# Patient Record
Sex: MALE | Race: WHITE | NOT HISPANIC OR LATINO | Employment: FULL TIME | URBAN - METROPOLITAN AREA
[De-identification: names, ages, dates, MRNs, and addresses within clinical notes are randomized per-mention and may not be internally consistent; named-entity substitution may affect disease eponyms.]

---

## 2018-01-13 NOTE — MISCELLANEOUS
Message  Patient's mother on the phone because patient is in severe pain  Today is Friday, and last night he felt lightheaded and sick  He had a fever of 102  Took some Tylenol wasn't able to sleep; but when he woke his temperature was down to 99  It stayed 99 all day  Now he is starting again with sharp pains in the stomach and his fever is 103 tonight  He took Tylenol one hour ago and feels worse  Now his temperature is 103 7  He has a sharp abdominal pain and tingling of his hands and feet with head tightness  Patient relates a history to his mother that he has been having episodes such as these monthly and has done so in November, December, January, February, and March  He is under a lot of stress and that he had a job with mentally handicapped  He stopped it 3 weeks ago because of the stress and has not been able to find a new job and has been stressed since that time  Plan: Since patient has had recurrence of this illness it is best for him to be seen while he is acutely ill  His temperature of 103 makes one worry about an abscess or some GI catastrophe  Because of this I asked him to go to [address] [place] [address] to the emergency room so they can be seen as soon as possible and at least get blood work and a CAT scan  This way they will know whether it is something that is life-threatening or something that could be done as an outpatient      Recheck as scheduled otherwise or call as needed      Signatures   Electronically signed by : Sukhjinder Jasso DO; Jul 22 2016  9:35PM EST                       (Author)

## 2018-01-16 NOTE — RESULT NOTES
Message   PLease tell him BW looks normal  Await GI evaluation     Verified Results  (1) CBC/PLT/DIFF [id number] 12:00AM Stanton Souza     Test Name Result Flag Reference   WHITE BLOOD CELL COUNT 8 3 Thousand/uL  3 8-10 8   RED BLOOD CELL COUNT 5 40 Million/uL  4 20-5 80   HEMOGLOBIN 15 6 g/dL  13 2-17 1   HEMATOCRIT 46 8 %  38 5-50 0   MCV 86 8 fL  80 0-100 0   MCH 28 8 pg  27 0-33 0   MCHC 33 2 g/dL  32 0-36 0   RDW 12 6 %  11 0-15 0   PLATELET COUNT 581 Thousand/uL  140-400   MPV 8 8 fL  7 5-11 5   ABSOLUTE NEUTROPHILS 6059 cells/uL  1967-8977   ABSOLUTE LYMPHOCYTES 1560 cells/uL  850-3900   ABSOLUTE MONOCYTES 548 cells/uL  200-950   ABSOLUTE EOSINOPHILS 116 cells/uL     ABSOLUTE BASOPHILS 17 cells/uL  0-200   NEUTROPHILS 73 0 %     LYMPHOCYTES 18 8 %     MONOCYTES 6 6 %     EOSINOPHILS 1 4 %     BASOPHILS 0 2 %       (1) COMPREHENSIVE METABOLIC PANEL [id number] 70:19PT Nirali Craven     Test Name Result Flag Reference   GLUCOSE 90 mg/dL  65-99   Fasting reference interval   UREA NITROGEN (BUN) 13 mg/dL  7-25   CREATININE 0 67 mg/dL  0 60-1 35   eGFR NON-AFR   AMERICAN 134 mL/min/1 73m2  > OR = 60   eGFR AFRICAN AMERICAN 155 mL/min/1 73m2  > OR = 60   BUN/CREATININE RATIO   9-86   NOT APPLICABLE (calc)   SODIUM 141 mmol/L  135-146   POTASSIUM 4 0 mmol/L  3 5-5 3   CHLORIDE 105 mmol/L     CARBON DIOXIDE 27 mmol/L  20-31   CALCIUM 9 9 mg/dL  8 6-10 3   PROTEIN, TOTAL 7 6 g/dL  6 1-8 1   ALBUMIN 4 8 g/dL  3 6-5 1   GLOBULIN 2 8 g/dL (calc)  1 9-3 7   ALBUMIN/GLOBULIN RATIO 1 7 (calc)  1 0-2 5   BILIRUBIN, TOTAL 0 6 mg/dL  0 2-1 2   ALKALINE PHOSPHATASE 55 U/L     AST 17 U/L  10-40   ALT 26 U/L  9-46

## 2018-07-31 ENCOUNTER — OFFICE VISIT (OUTPATIENT)
Dept: FAMILY MEDICINE CLINIC | Facility: CLINIC | Age: 28
End: 2018-07-31
Payer: COMMERCIAL

## 2018-07-31 VITALS
OXYGEN SATURATION: 97 % | HEIGHT: 66 IN | BODY MASS INDEX: 25.71 KG/M2 | TEMPERATURE: 98.6 F | HEART RATE: 89 BPM | DIASTOLIC BLOOD PRESSURE: 80 MMHG | SYSTOLIC BLOOD PRESSURE: 115 MMHG | WEIGHT: 160 LBS

## 2018-07-31 DIAGNOSIS — N23 RENAL COLIC ON RIGHT SIDE: Primary | ICD-10-CM

## 2018-07-31 PROCEDURE — 99212 OFFICE O/P EST SF 10 MIN: CPT | Performed by: FAMILY MEDICINE

## 2018-07-31 PROCEDURE — 3008F BODY MASS INDEX DOCD: CPT | Performed by: FAMILY MEDICINE

## 2018-07-31 RX ORDER — ESOMEPRAZOLE MAGNESIUM 40 MG/1
1 CAPSULE, DELAYED RELEASE ORAL DAILY
COMMUNITY
Start: 2016-09-02 | End: 2018-08-11

## 2018-07-31 NOTE — PROGRESS NOTES
PHQ-9 Depression Screening    PHQ-9:    Frequency of the following problems over the past two weeks:       Little interest or pleasure in doing things:  0 - not at all  Feeling down, depressed, or hopeless:  0 - not at all  PHQ-2 Score:  0

## 2018-07-31 NOTE — PROGRESS NOTES
Assessment/Plan:  Renal colic on right side  The patient has moderate microscopic hematuria  No leukocytes or nitrites noted  He has colicky right-sided abdominal pain which radiates to his penile region  He has a strong family history of renal colic  I discussed with the patient that based on his history examination I believe that the most likely source of his symptomatology is renal colic  We asked him to get a strainer and strain his urine  He is not in severe pain and he declines analgesics presently  He is going to push fluids and rest   He is asked to call with a report in the next 48 hours of his progress  He will seek more urgent medical attention through the emergency room as needed  We discussed potential diagnostic testing X Mahesh Robin but due to his uninsured condition presently he declines same  Diagnoses and all orders for this visit:    Renal colic on right side    Other orders  -     esomeprazole (NexIUM) 40 MG capsule; Take 1 capsule by mouth daily          Subjective:   Chief Complaint   Patient presents with    Abdominal Pain     pt states the pain started on the R side last week and now he has devoloped urinary frequency  his urine dip showed mod amount of blood, no leukocystes        Patient ID: Omkar Betancourt is a 32 y o  male  I feel like I have to pee all the time  Early last week I had pain indicated as R periumbilical area but seemed to move lower  Indicates L inguinal area  Colicky in nature  Flatus seems to relieve it  I feel a pressure inside my penis  No N/V  Sunday I felt feverish and sweats  Was not in tremendous pain  Possible FH of renal colic  HPI  Patient is a 19-year-old male who presents today with complaint of frequent urination  He also has a feeling of discomfort in his penis  He states that early last week he had some pain which she indicates to be in the right periumbilical area which was colicky in nature    It has since moved into his lower pelvis and inguinal region  He does state that seems to relieve it  He has had no nausea vomiting or anorexia  He states Sunday he felt fevers and had sweats but that has resolved  He states that the pain is not tremendous  He does have family history of renal colic with a grandfather that had frequent episodes of renal colic  He is status post appendectomy 2016  He has no personal history of renal colic  He has had no gross hematuria  He has had no penile discharge  The following portions of the patient's history were reviewed and updated as appropriate: allergies, current medications, past family history, past medical history, past social history, past surgical history and problem list     ROS    See HPI for limited review of systems  Objective:    Physical Exam   Constitutional: He appears well-developed and well-nourished  Cardiovascular: Normal rate and regular rhythm  Pulmonary/Chest: Effort normal and breath sounds normal    Abdominal: Soft  Bowel sounds are normal  He exhibits no mass  There is no tenderness  Soft nondistended nontender without mass organomegaly  No CVA tenderness  Genitourinary: Penis normal  No penile tenderness  Genitourinary Comments: Genitalia reveal normally descended testicles bilaterally without atrophy, focality or nodule  He has no inguinal adenopathy    Penis is nontender and there is no urethral/meatal tenderness or erythema

## 2018-07-31 NOTE — ASSESSMENT & PLAN NOTE
The patient has moderate microscopic hematuria  No leukocytes or nitrites noted  He has colicky right-sided abdominal pain which radiates to his penile region  He has a strong family history of renal colic  I discussed with the patient that based on his history examination I believe that the most likely source of his symptomatology is renal colic  We asked him to get a strainer and strain his urine  He is not in severe pain and he declines analgesics presently  He is going to push fluids and rest   He is asked to call with a report in the next 48 hours of his progress  He will seek more urgent medical attention through the emergency room as needed  We discussed potential diagnostic testing X Rosa Baltazar but due to his uninsured condition presently he declines same

## 2018-08-11 ENCOUNTER — OFFICE VISIT (OUTPATIENT)
Dept: FAMILY MEDICINE CLINIC | Facility: CLINIC | Age: 28
End: 2018-08-11
Payer: COMMERCIAL

## 2018-08-11 VITALS
WEIGHT: 159 LBS | TEMPERATURE: 101.8 F | DIASTOLIC BLOOD PRESSURE: 62 MMHG | SYSTOLIC BLOOD PRESSURE: 110 MMHG | BODY MASS INDEX: 25.66 KG/M2

## 2018-08-11 DIAGNOSIS — R31.9 HEMATURIA, UNSPECIFIED TYPE: Primary | ICD-10-CM

## 2018-08-11 DIAGNOSIS — R30.9 PAINFUL URINATION: ICD-10-CM

## 2018-08-11 LAB
SL AMB  POCT GLUCOSE, UA: ABNORMAL
SL AMB LEUKOCYTE ESTERASE,UA: ABNORMAL
SL AMB POCT BILIRUBIN,UA: ABNORMAL
SL AMB POCT BLOOD,UA: ABNORMAL
SL AMB POCT KETONES,UA: ABNORMAL
SL AMB POCT NITRITE,UA: ABNORMAL
SL AMB POCT PH,UA: 6
SL AMB POCT SPECIFIC GRAVITY,UA: 1.01
SL AMB POCT URINE PROTEIN: ABNORMAL
SL AMB POCT UROBILINOGEN: 0.2

## 2018-08-11 PROCEDURE — 99212 OFFICE O/P EST SF 10 MIN: CPT | Performed by: FAMILY MEDICINE

## 2018-08-11 PROCEDURE — 81002 URINALYSIS NONAUTO W/O SCOPE: CPT | Performed by: FAMILY MEDICINE

## 2018-08-11 RX ORDER — CIPROFLOXACIN 500 MG/1
500 TABLET, FILM COATED ORAL EVERY 12 HOURS SCHEDULED
Qty: 20 TABLET | Refills: 0 | Status: SHIPPED | OUTPATIENT
Start: 2018-08-11 | End: 2018-08-21

## 2018-08-11 NOTE — PROGRESS NOTES
Assessment/Plan:  Hematuria  The patient has moderate microscopic hematuria as he did at his last visit  He has some vague genitourinary symptomatology  Not classic for renal colic though he does have a strong family history of same  He does have some aching and chills today and he has a fever of 101 8  He does not have pyelonephritis clinically  We are going to treat him with Cipro 500 b i d  for 10 days  He is going to use probiotics  We discussed possible further testing such as ultrasound CT scan X [facility]  Currently he would like to try to avoid this due to the fact that he has a insurance plan which has only catastrophic coverage  He is going to be employed soon and feels he will have insurance on October 1st   We are going to see how he progresses over the next several days as far as consideration of further evaluation  We did tell him if he develops worsening fever flank pain chills altered mentation X Minerva Hogan that he would need to seek evaluation in the emergency room to which he agrees  Otherwise he is going to call on Monday with report of his condition  Diagnoses and all orders for this visit:    Hematuria, unspecified type  -     ciprofloxacin (CIPRO) 500 mg tablet; Take 1 tablet (500 mg total) by mouth every 12 (twelve) hours for 10 days    Painful urination  -     POCT urine dip          Subjective:   Chief Complaint   Patient presents with    Painful Urination     Feels Feverish        Patient ID: Naz Ryan is a 32 y o  male  Continued to have intermittent penile pain since last visit  Last night went to pee but seemed obstructed  Strained a little and seemed to urinate " a lot "  Immediately after had shaking chills and aching  Awoke this morning feeling tired, lethargic and had a fever to 101 8  Urination this am somewhat uncomfortable but no dysuria  " Pressure"  No flank pain  No nausea but anorexia  No V/D  No testicular pain     HPI   The patient is a 43-year-old male who states that he has had some intermittent symptoms since his last visit 11 days ago  At that time he had some right-sided colicky abdominal pain  He has a strong family history of renal colic and he did have some moderate microscopic hematuria  At that point we felt that his symptoms are most likely related to renal colic  He declined analgesics  We asked him to strain his urine  He states that since he was here he has had some intermittent penile discomfort though no real colicky abdominal pain and no flank pain  He states that last night when he went to urinate prior to bed he felt like his flow was obstructed he strained a little and it seemed to suddenly begin and he urinated what he describes as a lot  Immediately afterwards he had some shaking chills and some aching all over  He went to sleep and awoke this morning feeling somewhat lethargic and achy  He took his temperature and it was 101 8  He states that he continued to have somewhat uncomfortable urination 1st thing but this seems to have resolved  He describes pressure in his penile region  He has had no flank pain no nausea or vomiting but he does have some anorexia  He has no testicular pain  He has had no urethral discharge  Currently he has only catastrophic insurance and has to pay for everything out pocket  The following portions of the patient's history were reviewed and updated as appropriate: allergies, current medications, past family history, past medical history, past social history and problem list     ROS    Review of systems as per HPI  Objective:    Physical Exam   Constitutional: He is oriented to person, place, and time  He appears well-developed and well-nourished  No distress  HENT:   Mouth/Throat: Oropharynx is clear and moist  No oropharyngeal exudate  Eyes: Conjunctivae are normal    Neck: No JVD present  Cardiovascular: Normal rate, regular rhythm and normal heart sounds  Exam reveals no gallop      No murmur heard  Heart rate in the 70s   Pulmonary/Chest: Effort normal and breath sounds normal  No respiratory distress  He has no wheezes  He has no rales  Abdominal: Soft  Bowel sounds are normal  He exhibits no mass  There is no tenderness  Currently has no CVA tenderness  His abdomen is soft nondistended nontender and without mass organomegaly  Specifically suprapubic region is nontender  Genitourinary:   Genitourinary Comments: Examination of his genitalia reveal normal penis without urethral tenderness  Meatus appears normal   There is no drainage  Testicles are descended bilaterally without mass or tenderness  No hernia noted  No inguinal adenopathy  Lymphadenopathy:     He has no cervical adenopathy  Neurological: He is alert and oriented to person, place, and time  Skin: No rash noted  No erythema  Psychiatric: He has a normal mood and affect   Thought content normal

## 2018-08-11 NOTE — ASSESSMENT & PLAN NOTE
The patient has moderate microscopic hematuria as he did at his last visit  He has some vague genitourinary symptomatology  Not classic for renal colic though he does have a strong family history of same  He does have some aching and chills today and he has a fever of 101 8  He does not have pyelonephritis clinically  We are going to treat him with Cipro 500 b i d  for 10 days  He is going to use probiotics  We discussed possible further testing such as ultrasound CT scan X [facility]  Currently he would like to try to avoid this due to the fact that he has a insurance plan which has only catastrophic coverage  He is going to be employed soon and feels he will have insurance on October 1st   We are going to see how he progresses over the next several days as far as consideration of further evaluation  We did tell him if he develops worsening fever flank pain chills altered mentation X Keiry Winkler that he would need to seek evaluation in the emergency room to which he agrees  Otherwise he is going to call on Monday with report of his condition

## 2018-08-14 ENCOUNTER — HOSPITAL ENCOUNTER (EMERGENCY)
Facility: HOSPITAL | Age: 28
Discharge: HOME | End: 2018-08-14
Attending: EMERGENCY MEDICINE | Admitting: EMERGENCY MEDICINE
Payer: COMMERCIAL

## 2018-08-14 ENCOUNTER — APPOINTMENT (EMERGENCY)
Dept: CT IMAGING | Facility: HOSPITAL | Age: 28
End: 2018-08-14
Payer: COMMERCIAL

## 2018-08-14 VITALS
OXYGEN SATURATION: 98 % | RESPIRATION RATE: 20 BRPM | TEMPERATURE: 98.2 F | HEART RATE: 77 BPM | HEIGHT: 66 IN | WEIGHT: 162.92 LBS | SYSTOLIC BLOOD PRESSURE: 141 MMHG | BODY MASS INDEX: 26.18 KG/M2 | DIASTOLIC BLOOD PRESSURE: 82 MMHG

## 2018-08-14 DIAGNOSIS — N23 RENAL COLIC ON RIGHT SIDE: Primary | ICD-10-CM

## 2018-08-14 DIAGNOSIS — R11.0 NAUSEA: ICD-10-CM

## 2018-08-14 LAB
ALBUMIN SERPL BCP-MCNC: 4.1 G/DL (ref 3.5–5)
ALP SERPL-CCNC: 61 U/L (ref 46–116)
ALT SERPL W P-5'-P-CCNC: 63 U/L (ref 12–78)
ANION GAP SERPL CALCULATED.3IONS-SCNC: 9 MMOL/L (ref 4–13)
APTT PPP: 28 SECONDS (ref 24–36)
AST SERPL W P-5'-P-CCNC: 33 U/L (ref 5–45)
BACTERIA UR QL AUTO: ABNORMAL /HPF
BASOPHILS # BLD AUTO: 0.02 THOUSANDS/ΜL (ref 0–0.1)
BASOPHILS NFR BLD AUTO: 0 % (ref 0–1)
BILIRUB SERPL-MCNC: 0.6 MG/DL (ref 0.2–1)
BILIRUB UR QL STRIP: NEGATIVE
BUN SERPL-MCNC: 15 MG/DL (ref 5–25)
CALCIUM SERPL-MCNC: 9.3 MG/DL (ref 8.3–10.1)
CHLORIDE SERPL-SCNC: 101 MMOL/L (ref 100–108)
CK SERPL-CCNC: 76 U/L (ref 39–308)
CLARITY UR: CLEAR
CO2 SERPL-SCNC: 29 MMOL/L (ref 21–32)
COLOR UR: YELLOW
CREAT SERPL-MCNC: 1.24 MG/DL (ref 0.6–1.3)
EOSINOPHIL # BLD AUTO: 0.04 THOUSAND/ΜL (ref 0–0.61)
EOSINOPHIL NFR BLD AUTO: 1 % (ref 0–6)
ERYTHROCYTE [DISTWIDTH] IN BLOOD BY AUTOMATED COUNT: 11.4 % (ref 11.6–15.1)
GFR SERPL CREATININE-BSD FRML MDRD: 79 ML/MIN/1.73SQ M
GLUCOSE SERPL-MCNC: 96 MG/DL (ref 65–140)
GLUCOSE UR STRIP-MCNC: NEGATIVE MG/DL
HCT VFR BLD AUTO: 42.4 % (ref 36.5–49.3)
HGB BLD-MCNC: 15.2 G/DL (ref 12–17)
HGB UR QL STRIP.AUTO: ABNORMAL
IMM GRANULOCYTES # BLD AUTO: 0.02 THOUSAND/UL (ref 0–0.2)
IMM GRANULOCYTES NFR BLD AUTO: 0 % (ref 0–2)
INR PPP: 1 (ref 0.86–1.17)
KETONES UR STRIP-MCNC: ABNORMAL MG/DL
LACTATE SERPL-SCNC: 0.9 MMOL/L (ref 0.5–2)
LEUKOCYTE ESTERASE UR QL STRIP: NEGATIVE
LIPASE SERPL-CCNC: 157 U/L (ref 73–393)
LYMPHOCYTES # BLD AUTO: 1.19 THOUSANDS/ΜL (ref 0.6–4.47)
LYMPHOCYTES NFR BLD AUTO: 14 % (ref 14–44)
MCH RBC QN AUTO: 29.3 PG (ref 26.8–34.3)
MCHC RBC AUTO-ENTMCNC: 35.8 G/DL (ref 31.4–37.4)
MCV RBC AUTO: 82 FL (ref 82–98)
MONOCYTES # BLD AUTO: 0.54 THOUSAND/ΜL (ref 0.17–1.22)
MONOCYTES NFR BLD AUTO: 7 % (ref 4–12)
MUCOUS THREADS UR QL AUTO: ABNORMAL
NEUTROPHILS # BLD AUTO: 6.48 THOUSANDS/ΜL (ref 1.85–7.62)
NEUTS SEG NFR BLD AUTO: 78 % (ref 43–75)
NITRITE UR QL STRIP: NEGATIVE
NON-SQ EPI CELLS URNS QL MICRO: ABNORMAL /HPF
NRBC BLD AUTO-RTO: 0 /100 WBCS
PH UR STRIP.AUTO: 7 [PH] (ref 4.5–8)
PLATELET # BLD AUTO: 216 THOUSANDS/UL (ref 149–390)
PMV BLD AUTO: 9.2 FL (ref 8.9–12.7)
POTASSIUM SERPL-SCNC: 3.6 MMOL/L (ref 3.5–5.3)
PROT SERPL-MCNC: 7.9 G/DL (ref 6.4–8.2)
PROT UR STRIP-MCNC: ABNORMAL MG/DL
PROTHROMBIN TIME: 12.9 SECONDS (ref 11.8–14.2)
RBC # BLD AUTO: 5.18 MILLION/UL (ref 3.88–5.62)
RBC #/AREA URNS AUTO: ABNORMAL /HPF
SODIUM SERPL-SCNC: 139 MMOL/L (ref 136–145)
SP GR UR STRIP.AUTO: 1.02 (ref 1–1.03)
UROBILINOGEN UR QL STRIP.AUTO: 0.2 E.U./DL
WBC # BLD AUTO: 8.29 THOUSAND/UL (ref 4.31–10.16)
WBC #/AREA URNS AUTO: ABNORMAL /HPF

## 2018-08-14 PROCEDURE — 85610 PROTHROMBIN TIME: CPT | Performed by: EMERGENCY MEDICINE

## 2018-08-14 PROCEDURE — 83690 ASSAY OF LIPASE: CPT | Performed by: EMERGENCY MEDICINE

## 2018-08-14 PROCEDURE — 74177 CT ABD & PELVIS W/CONTRAST: CPT

## 2018-08-14 PROCEDURE — 87086 URINE CULTURE/COLONY COUNT: CPT | Performed by: EMERGENCY MEDICINE

## 2018-08-14 PROCEDURE — 36415 COLL VENOUS BLD VENIPUNCTURE: CPT | Performed by: EMERGENCY MEDICINE

## 2018-08-14 PROCEDURE — 85730 THROMBOPLASTIN TIME PARTIAL: CPT | Performed by: EMERGENCY MEDICINE

## 2018-08-14 PROCEDURE — 83605 ASSAY OF LACTIC ACID: CPT | Performed by: EMERGENCY MEDICINE

## 2018-08-14 PROCEDURE — 99284 EMERGENCY DEPT VISIT MOD MDM: CPT

## 2018-08-14 PROCEDURE — 80053 COMPREHEN METABOLIC PANEL: CPT | Performed by: EMERGENCY MEDICINE

## 2018-08-14 PROCEDURE — 85025 COMPLETE CBC W/AUTO DIFF WBC: CPT | Performed by: EMERGENCY MEDICINE

## 2018-08-14 PROCEDURE — 96360 HYDRATION IV INFUSION INIT: CPT

## 2018-08-14 PROCEDURE — 96361 HYDRATE IV INFUSION ADD-ON: CPT

## 2018-08-14 PROCEDURE — 82550 ASSAY OF CK (CPK): CPT | Performed by: EMERGENCY MEDICINE

## 2018-08-14 PROCEDURE — 81001 URINALYSIS AUTO W/SCOPE: CPT

## 2018-08-14 PROCEDURE — 87040 BLOOD CULTURE FOR BACTERIA: CPT | Performed by: EMERGENCY MEDICINE

## 2018-08-14 RX ORDER — METOCLOPRAMIDE 10 MG/1
10 TABLET ORAL EVERY 6 HOURS
Qty: 30 TABLET | Refills: 0 | Status: SHIPPED | OUTPATIENT
Start: 2018-08-14 | End: 2019-01-08

## 2018-08-14 RX ORDER — OXYCODONE HYDROCHLORIDE AND ACETAMINOPHEN 5; 325 MG/1; MG/1
1 TABLET ORAL EVERY 6 HOURS PRN
Qty: 20 TABLET | Refills: 0 | Status: SHIPPED | OUTPATIENT
Start: 2018-08-14 | End: 2018-08-24

## 2018-08-14 RX ADMIN — IOHEXOL 100 ML: 350 INJECTION, SOLUTION INTRAVENOUS at 08:21

## 2018-08-14 RX ADMIN — SODIUM CHLORIDE 1000 ML: 0.9 INJECTION, SOLUTION INTRAVENOUS at 07:42

## 2018-08-14 NOTE — ED ATTENDING ATTESTATION
Eliecer Le MD, saw and evaluated the patient  I have discussed the patient with the resident/non-physician practitioner and agree with the resident's/non-physician practitioner's findings, Plan of Care, and MDM as documented in the resident's/non-physician practitioner's note, except where noted  All available labs and Radiology studies were reviewed  At this point I agree with the current assessment done in the Emergency Department  I have conducted an independent evaluation of this patient a history and physical is as follows:  Patient is a 32year old male with intermittent R flank pain with occasional nausea and urinary frequency and dribbling for past 2 weeks but worse over the weekend and had fever of 101  Saw PMD who placed patient on cipro  Has had prior appy  Was last seen in this ED on 7/22/16 for appendicitis  Patient urinated in ED and felt better  No pain now  No N/V/D now  Abdomen nontender and no CVAT at this time  DDx including but not limited to: renal colic, pyelonephritis, UTI, GI etiology, diverticulitis, cholecystitis, biliary colic, rhabdomyolysis, tumor  Will check labs and urine and CT         Critical Care Time  CritCare Time    Procedures

## 2018-08-14 NOTE — DISCHARGE INSTRUCTIONS
Acute Nausea and Vomiting   WHAT YOU NEED TO KNOW:   Acute nausea and vomiting start suddenly, worsen quickly, and last a short time  DISCHARGE INSTRUCTIONS:   Return to the emergency department if:   · You see blood in your vomit or your bowel movements  · You have sudden, severe pain in your chest and upper abdomen after hard vomiting or retching  · You have swelling in your neck and chest      · You are dizzy, cold, and thirsty and your eyes and mouth are dry  · You are urinating very little or not at all  · You have muscle weakness, leg cramps, and trouble breathing  · Your heart is beating much faster than normal      · You continue to vomit for more than 48 hours  Contact your healthcare provider if:   · You have frequent dry heaves (vomiting but nothing comes out)  · Your nausea and vomiting does not get better or go away after you use medicine  · You have questions or concerns about your condition or treatment  Medicines: You may need any of the following:  · Medicines  may be given to calm your stomach and stop your vomiting  You may also need medicines to help you feel more relaxed or to stop nausea and vomiting caused by motion sickness  · Gastrointestinal stimulants  are used to help empty your stomach and bowels  This may help decrease nausea and vomiting  · Take your medicine as directed  Contact your healthcare provider if you think your medicine is not helping or if you have side effects  Tell him or her if you are allergic to any medicine  Keep a list of the medicines, vitamins, and herbs you take  Include the amounts, and when and why you take them  Bring the list or the pill bottles to follow-up visits  Carry your medicine list with you in case of an emergency  Prevent or manage acute nausea and vomiting:   · Do not drink alcohol  Alcohol may upset or irritate your stomach  Too much alcohol can also cause acute nausea and vomiting  · Control stress    Headaches due to stress may cause nausea and vomiting  Find ways to relax and manage your stress  Get more rest and sleep  · Drink more liquids as directed  Vomiting can lead to dehydration  It is important to drink more liquids to help replace lost body fluids  Ask your healthcare provider how much liquid to drink each day and which liquids are best for you  Your provider may recommend that you drink an oral rehydration solution (ORS)  ORS contains water, salts, and sugar that are needed to replace the lost body fluids  Ask what kind of ORS to use, how much to drink, and where to get it  · Eat smaller meals, more often  Eat small amounts of food every 2 to 3 hours, even if you are not hungry  Food in your stomach may decrease your nausea  · Talk to your healthcare provider before you take over-the-counter (OTC) medicines  These medicines can cause serious problems if you use certain other medicines, or you have a medical condition  You may have problems if you use too much or use them for longer than the label says  Follow directions on the label carefully  Follow up with your healthcare provider as directed:  Write down your questions so you remember to ask them during your follow-up visits  © 2017 [employer]  Information is for End User's use only and may not be sold, redistributed or otherwise used for commercial purposes  All illustrations and images included in CareNotes® are the copyrighted property of [employer] , [employer]  or [employer]over  The above information is an  only  It is not intended as medical advice for individual conditions or treatments  Talk to your doctor, nurse or pharmacist before following any medical regimen to see if it is safe and effective for you  Renal Colic   WHAT YOU NEED TO KNOW:   Renal colic is severe pain in your lower back or sides  The pain is usually on one side, but may be on both sides of your lower back   Renal colic may start quickly, come and go, and become worse over time  Renal colic is caused by a blockage in your urinary tract  The most common cause of a blockage is a kidney stone  Blood clots, ureter spasms, and dead tissue may also block your urinary tract  DISCHARGE INSTRUCTIONS:   Return to the emergency department if:   · You cannot stop vomiting  · You see new or increased bleeding when you urinate  · You are urinating less than usual, or not at all  · Your pain is not getting better even after treatment  Contact your healthcare provider if:   · You have fever  · You need to urinate more often than usual, or right away  · You see a stone in your urine strainer after you urinate  · You have questions or concerns about your condition or care  Medicines:   · Medicines  may help decrease pain and muscle spasms  You may also need medicine to calm your stomach and stop vomiting  · Take your medicine as directed  Contact your healthcare provider if you think your medicine is not helping or if you have side effects  Tell him of her if you are allergic to any medicine  Keep a list of the medicines, vitamins, and herbs you take  Include the amounts, and when and why you take them  Bring the list or the pill bottles to follow-up visits  Carry your medicine list with you in case of an emergency  Manage your symptoms:   · Drink liquids as directed  to help decrease pain and flush blockages from your urinary tract  Ask how much liquid to drink each day and which liquids are best for you  You may need to drink about 3 liters (12 glasses) of liquids each day  Half of your total daily liquids should be water  Limit coffee, tea, and soda to 2 cups daily  Your urine should be pale and clear  · Strain your urine every time you urinate  Urinate into a strainer (funnel with a fine mesh on the bottom) or glass jar to collect kidney stones  Give the kidney stones to your healthcare provider at your next visit  · Eat a variety of healthy foods  Healthy foods include fruits, vegetables, whole-grain breads, low-fat dairy products, beans, lean meats, and fish  You may need to increase the amount of citrus fruit you eat, such as oranges  Ask your healthcare provider how much salt, calcium, and protein you should eat  · Avoid activity in hot temperatures  Heat may cause you to become dehydrated and urinate less  Follow up with your healthcare provider as directed: You may need to return for tests to check if your blockage has cleared  Write down your questions so you remember to ask them during your visits  © 2017 [employer] Information is for End User's use only and may not be sold, redistributed or otherwise used for commercial purposes  All illustrations and images included in CareNotes® are the copyrighted property of [employer]  or [employer]over  The above information is an  only  It is not intended as medical advice for individual conditions or treatments  Talk to your doctor, nurse or pharmacist before following any medical regimen to see if it is safe and effective for you

## 2018-08-14 NOTE — ED PROVIDER NOTES
History  Chief Complaint   Patient presents with    Flank Pain     Patient states intermittent right sided flank pain x2 weeks, worsened this weekend  Complaining of urinary frequency, dribbling  Prescribed Ciprofloxacin on Saturday by PCP     71-year-old male presents emergency department with complaints of a combination of intermittent dysuria, urinary urgency, difficulty starting urine flow  He reports that symptoms began prior to July 31st when he was seen by his primary care provider for right-sided flank pain, at that time he was diagnosed with acid reflux in prescribed Nexium  Seen again on August 11th with right-sided flank pain and fever, urinalysis was completed which revealed no infection however he was started on Cipro and diagnosed with renal colic  Patient presents emergency department today with complaints of unbearable 10/10 right flank pain that radiates to the penile area  Reports that pain was present until he urinated during stay in the ED  A current patient has no right flank pain no CVA tenderness on either side and reports urinating without difficulty  Urine inspected and there are no evidence of stone or sedimentation and specimen cup  Patient denies seen stone and toilet  Reports that he last had fever on August 11th has been intermittently able to urinate without difficulty  ROS:  Intermittent chills, nausea and right flank pain  Has been afebrile for the past 48 hrs  Denies chest pain/tenderness, shortness of breath, vomiting, diarrhea, sick contacts, trauma, abdominal pain, changes in bowel habits, black tarry stool  History provided by:  Patient   used: No        Prior to Admission Medications   Prescriptions Last Dose Informant Patient Reported?  Taking?   ciprofloxacin (CIPRO) 500 mg tablet   No No   Sig: Take 1 tablet (500 mg total) by mouth every 12 (twelve) hours for 10 days      Facility-Administered Medications: None       Past Medical History:   Diagnosis Date    Abdominal pain, periumbilical 8/6/8219    Appendicitis 7/23/2016    Blepharitis of eyelid of left eye 6/30/2016    Eczema     Hypokalemia 9/2/2016    Leukocytosis 9/2/2016       Past Surgical History:   Procedure Laterality Date    TN LAP,APPENDECTOMY N/A 7/23/2016    Procedure: APPENDECTOMY LAPAROSCOPIC;  Surgeon: Sunitha Desouza DO;  Location[facility];  Service: General       Family History   Problem Relation Age of Onset    Nephrolithiasis Maternal Grandfather      I have reviewed and agree with the history as documented  Social History   Substance Use Topics    Smoking status: Never Smoker    Smokeless tobacco: Never Used    Alcohol use No        Review of Systems   Constitutional: Positive for chills and fever  Negative for appetite change and diaphoresis  Respiratory: Negative for chest tightness and shortness of breath  Cardiovascular: Negative for chest pain and palpitations  Gastrointestinal: Positive for nausea  Negative for abdominal distention, abdominal pain, blood in stool, diarrhea and vomiting  Genitourinary: Positive for difficulty urinating, dysuria, flank pain and urgency  Negative for genital sores, hematuria, penile swelling and testicular pain  Musculoskeletal: Negative for back pain  Skin: Negative for rash and wound  All other systems reviewed and are negative  Physical Exam  Physical Exam   Constitutional: He is oriented to person, place, and time  He appears well-developed and well-nourished  He appears distressed  Nontoxic in appearance in mild distress   HENT:   Head: Normocephalic  Mouth/Throat: Oropharynx is clear and moist    Eyes: EOM are normal  Pupils are equal, round, and reactive to light  Right eye exhibits no discharge  Left eye exhibits no discharge  No scleral icterus  Neck: Normal range of motion  Neck supple  Cardiovascular: Normal rate, regular rhythm, normal heart sounds and intact distal pulses  Exam reveals no gallop and no friction rub  No murmur heard  Pulmonary/Chest: Effort normal and breath sounds normal  No stridor  No respiratory distress  He has no wheezes  He has no rales  He exhibits no tenderness  Abdominal: Soft  Bowel sounds are normal  He exhibits no distension and no mass  There is no tenderness  There is no rebound and no guarding  No hernia  Genitourinary: Penis normal  No penile tenderness  Musculoskeletal: Normal range of motion  Neurological: He is alert and oriented to person, place, and time  Skin: Skin is warm and dry  Capillary refill takes less than 2 seconds  No rash noted  He is not diaphoretic  No erythema  No pallor  Psychiatric: He has a normal mood and affect  His behavior is normal    Nursing note and vitals reviewed  Vital Signs  ED Triage Vitals   Temperature Pulse Respirations Blood Pressure SpO2   08/14/18 0552 08/14/18 0552 08/14/18 0551 08/14/18 0552 08/14/18 0552   98 2 °F (36 8 °C) 77 20 141/82 98 %      Temp Source Heart Rate Source Patient Position - Orthostatic VS BP Location FiO2 (%)   08/14/18 0551 08/14/18 0551 08/14/18 0552 08/14/18 0552 --   Oral Monitor Sitting Left arm       Pain Score       08/14/18 0552       6           Vitals:    08/14/18 0552   BP: 141/82   Pulse: 77   Patient Position - Orthostatic VS: Sitting       Visual Acuity      ED Medications  Medications   sodium chloride 0 9 % bolus 1,000 mL (1,000 mL Intravenous New Bag 8/14/18 0742)   iohexol (OMNIPAQUE) 350 MG/ML injection (MULTI-DOSE) 100 mL (100 mL Intravenous Given 8/14/18 0821)       Diagnostic Studies  Results Reviewed     Procedure Component Value Units Date/Time    Urine culture [[id number]] Collected:  08/14/18 0845    Lab Status:   In process Specimen:  Urine from Urine, Clean Catch Updated:  08/14/18 0845    Urine Microscopic [[id number]]  (Abnormal) Collected:  08/14/18 0758    Lab Status:  Final result Specimen:  Urine Updated:  08/14/18 0841     RBC, UA 4-10 (A) /hpf      WBC, UA 0-1 (A) /hpf      Epithelial Cells Occasional /hpf      Bacteria, UA Occasional /hpf      MUCOUS THREADS Moderate (A)    Lactic acid, plasma [[id number]]  (Normal) Collected:  08/14/18 0740    Lab Status:  Final result Specimen:  Blood from Arm, Right Updated:  08/14/18 5868     LACTIC ACID 0 9 mmol/L     Narrative:         Result may be elevated if tourniquet was used during collection  Lipase [[id number]]  (Normal) Collected:  08/14/18 0740    Lab Status:  Final result Specimen:  Blood from Arm, Right Updated:  08/14/18 0809     Lipase 157 u/L     CK Total with Reflex CKMB [[id number]]  (Normal) Collected:  08/14/18 0740    Lab Status:  Final result Specimen:  Blood from Arm, Right Updated:  08/14/18 0809     Total CK 76 U/L     Comprehensive metabolic panel [[id number]] Collected:  08/14/18 0740    Lab Status:  Final result Specimen:  Blood from Arm, Right Updated:  08/14/18 2464     Sodium 139 mmol/L      Potassium 3 6 mmol/L      Chloride 101 mmol/L      CO2 29 mmol/L      Anion Gap 9 mmol/L      BUN 15 mg/dL      Creatinine 1 24 mg/dL      Glucose 96 mg/dL      Calcium 9 3 mg/dL      AST 33 U/L      ALT 63 U/L      Alkaline Phosphatase 61 U/L      Total Protein 7 9 g/dL      Albumin 4 1 g/dL      Total Bilirubin 0 60 mg/dL      eGFR 79 ml/min/1 73sq m     Narrative:         National Kidney Disease Education Program recommendations are as follows:  GFR calculation is accurate only with a steady state creatinine  Chronic Kidney disease less than 60 ml/min/1 73 sq  meters  Kidney failure less than 15 ml/min/1 73 sq  meters      Avery Reynolds [[id number]]  (Normal) Collected:  08/14/18 0740    Lab Status:  Final result Specimen:  Blood from Arm, Right Updated:  08/14/18 0805     Protime 12 9 seconds      INR 1 00    APTT [[id number]]  (Normal) Collected:  08/14/18 0740    Lab Status:  Final result Specimen:  Blood from Arm, Right Updated:  08/14/18 0805     PTT 28 seconds     Blood culture #2 [[id number]] Collected:  08/14/18 0740    Lab Status: In process Specimen:  Blood from Arm, Right Updated:  08/14/18 0754    CBC and differential [[id number]]  (Abnormal) Collected:  08/14/18 0740    Lab Status:  Final result Specimen:  Blood from Arm, Right Updated:  08/14/18 0752     WBC 8 29 Thousand/uL      RBC 5 18 Million/uL      Hemoglobin 15 2 g/dL      Hematocrit 42 4 %      MCV 82 fL      MCH 29 3 pg      MCHC 35 8 g/dL      RDW 11 4 (L) %      MPV 9 2 fL      Platelets 693 Thousands/uL      nRBC 0 /100 WBCs      Neutrophils Relative 78 (H) %      Immat GRANS % 0 %      Lymphocytes Relative 14 %      Monocytes Relative 7 %      Eosinophils Relative 1 %      Basophils Relative 0 %      Neutrophils Absolute 6 48 Thousands/µL      Immature Grans Absolute 0 02 Thousand/uL      Lymphocytes Absolute 1 19 Thousands/µL      Monocytes Absolute 0 54 Thousand/µL      Eosinophils Absolute 0 04 Thousand/µL      Basophils Absolute 0 02 Thousands/µL     ED Urine Macroscopic [[id number]]  (Abnormal) Collected:  08/14/18 0758    Lab Status:  Final result Specimen:  Urine Updated:  08/14/18 0749     Color, UA Yellow     Clarity, UA Clear     pH, UA 7 0     Leukocytes, UA Negative     Nitrite, UA Negative     Protein, UA 30 (1+) (A) mg/dl      Glucose, UA Negative mg/dl      Ketones, UA 15 (1+) (A) mg/dl      Urobilinogen, UA 0 2 E U /dl      Bilirubin, UA Negative     Blood, UA Moderate (A)     Specific [place], UA 1 025    Narrative:       CLINITEK RESULT    Blood culture #1 [[id number]]     Lab Status:  No result Specimen:  Blood                  CT abdomen pelvis with contrast   Final Result by Vicky Mar MD (08/14 0901)      3 mm right UVJ calculus evoking mild ureterectasis without significant hydronephrosis  2 mm nonobstructing left lower pole calculus  Appendectomy              Workstation performed: [id number]                    Procedures  Procedures       Phone Contacts  ED Phone Contact    ED Course MDM  Number of Diagnoses or Management Options  Nausea: new and requires workup  Renal colic on right side: new and requires workup  Diagnosis management comments: 59-year-old male presents emergency department with complaints of a combination of intermittent dysuria, urinary urgency, difficulty starting urine flow  DDx including but not limited to: renal colic, pyelonephritis, UTI, GI etiology, appendicitis, diverticulitis, cholecystitis, biliary colic, AAA, rhabdomyolysis, tumor  Will complete baseline labs, urinalysis, CT of the abdomen and pelvis  0800:Lab work does reveal increasing creatinine to 1 24 for baseline of 0 67  Urinalysis unremarkable  Still pending CT of abdomen and pelvis  Results discussed with patient and father, verbalized understanding  Will continue IV hydration of normal saline at 125 cc/hour  0900:  CT abdomen and pelvis the results reveal: 3 mm right UVJ calculus evoking mild ureterectasis without significant hydronephrosis  2 mm nonobstructing left lower pole calculus  Findings explained to patient and family at bedside, verbalized understanding  Patient will be discharged and are in agreement with plan of care  Will discharge home with Reglan and Percocet  Instructed to follow up with Urology within 2 days a visit  Given strict instructions on when to return to the emergency department  The patient (and any family present) verbalized understanding of the discharge instructions and warnings that would necessitate return to the Emergency Department  Gave verbal in addition to written discharge instructions  Specifically highlighted areas of special concern regarding the written and verbal discharge instructions and return precautions  All questions were answered prior to discharge           Amount and/or Complexity of Data Reviewed  Clinical lab tests: ordered and reviewed  Tests in the radiology section of CPT®: ordered and reviewed  Discuss the patient with other providers: yes (Dr Eric Melendez)    Risk of Complications, Morbidity, and/or Mortality  Presenting problems: moderate  Diagnostic procedures: moderate  Management options: moderate    Patient Progress  Patient progress: stable    CritCare Time    Disposition  Final diagnoses:   Nausea   Renal colic on right side     Time reflects when diagnosis was documented in both MDM as applicable and the Disposition within this note     Time User Action Codes Description Comment    8/14/2018  9:05 AM Zachery Laming Add [R11 0] Nausea     8/14/2018  9:05 AM Zachery Laming Add [Q13] Renal colic     8/90/3123  8:64 AM Zachery Laming Remove [W10] Renal colic     3/58/5789  3:21 AM Zachery Laming Add [G03] Renal colic on right side     8/14/2018  9:05 AM [place], [facility] [R11 0] Nausea     8/14/2018  9:05 AM Zachery Laming Modify [A70] Renal colic on right side       ED Disposition     ED Disposition Condition Comment    Discharge  Dana Boone 1943 discharge to home/self care  Condition at discharge: Stable        Follow-up Information     Follow up With Specialties Details Why Contact Info Additional [facility] [place] [facility] Schedule an appointment as soon as possible for a visit in 2 days For follow-up and further management of renal colic    [address] [facility] [address]  [place] [address] Emergency Department Emergency Medicine  If symptoms worsen or become worrisome [address] [place] [address] [phone] [facility], [address], [address]    Easton Maya MD Family Medicine  As needed [address] [address] [phone]             Patient's Medications   Discharge Prescriptions    METOCLOPRAMIDE (REGLAN) 10 MG TABLET    Take 1 tablet (10 mg total) by mouth every 6 (six) hours       Start Date: 8/14/2018 End Date: --       Order Dose: 10 mg       Quantity: 30 tablet    Refills: 0    OXYCODONE-ACETAMINOPHEN (PERCOCET) 5-325 MG PER TABLET    Take 1 tablet by mouth every 6 (six) hours as needed for moderate pain for up to 10 days Max Daily Amount: 4 tablets       Start Date: 8/14/2018 End Date: 8/24/2018       Order Dose: 1 tablet       Quantity: 20 tablet    Refills: 0     No discharge procedures on file      ED Provider  Electronically Signed by           [employer], Shantal Hall  08/14/18 7293

## 2018-08-15 LAB — BACTERIA UR CULT: NORMAL

## 2018-08-19 LAB — BACTERIA BLD CULT: NORMAL

## 2018-11-12 ENCOUNTER — TELEPHONE (OUTPATIENT)
Dept: UROLOGY | Facility: MEDICAL CENTER | Age: 28
End: 2018-11-12

## 2018-11-12 NOTE — TELEPHONE ENCOUNTER
Please triage:    Reason for appointment/Complaint/Diagnosis :  Kidney stone - right side flank pain - was seen at [facility] in [place] on 11/9/18  Insurance: Dian Herrera    History of Cancer? no                       If yes, what kind? Previous urologist?     No                  Records requested/where? In [facility]    Outside testing/where? N/A    Location Preference for office visit? [place], any time or day

## 2018-11-12 NOTE — TELEPHONE ENCOUNTER
Info from [facility] sent over to be scanned in patient's chart  Left VM for patient advising we will contact him tomorrow with appointment information

## 2018-11-13 NOTE — TELEPHONE ENCOUNTER
UA from [facility] revealing microscopic blood, leukocyte and nitrite negative  Would recommend new patient appointment within the next 1-2 weeks  Please provide ER precautions in the meantime  Thank you

## 2018-11-13 NOTE — TELEPHONE ENCOUNTER
Called and spoke to patient  He was initially seen in [facility] on 8/14/18 for kidney stone,  CT done 8/14/18 ,  3 mm right UVJ calculus and 2 mm non obstructing left lower pole calculus  He states that he has not noticed passing any stones over the past few weeks  He has been experiencing symptoms off and on for the past few weeks,  He initially did not follow up because he did not have health insurance, he now has coverage  Patient states that on Friday 11/9/18 he had increased right sided flank pain, vomited multiple times, and had cold sweats  He also experienced increased pressure, tight feeling in penis and urinary urgency  He was seen at [facility],  In the media section labs are scan UA with Micro done  He has had no further imaging  He was not prescribed any medications   Patient states his symptoms slightly worsened on Saturday 11/10/18, he has not noticed passing any stone  This morning his pain level is 1  He states he has more of bilateral  muscle soreness right and left lower back, he is urinating without difficulty, no fever, no chills, no nausea or vomiting  He is at work today  Advised patient that message will be sent to provider to review and make recommendation and clinical will call him back  Patient will be in and out of office meetings and would like detailed message left on voicemail and he will return call  Please route to [facility] clinical pool  Thank you

## 2018-11-13 NOTE — TELEPHONE ENCOUNTER
Attempted to reach patient, left detailed message it is recommended he be scheduled for new patient appointment within the next 2 weeks,  Advised we had opening tomorrow and that I have tentatively scheduled him for NP appt on 11/14/18 at 10:45 AM with Dr Robin Khalil, request he call office back to confirm and to get scheduling /office location details

## 2018-11-14 ENCOUNTER — OFFICE VISIT (OUTPATIENT)
Dept: UROLOGY | Facility: CLINIC | Age: 28
End: 2018-11-14
Payer: COMMERCIAL

## 2018-11-14 VITALS
SYSTOLIC BLOOD PRESSURE: 110 MMHG | WEIGHT: 152.4 LBS | DIASTOLIC BLOOD PRESSURE: 80 MMHG | HEART RATE: 99 BPM | BODY MASS INDEX: 23.92 KG/M2 | HEIGHT: 67 IN

## 2018-11-14 DIAGNOSIS — N20.0 KIDNEY STONES: Primary | ICD-10-CM

## 2018-11-14 LAB
SL AMB  POCT GLUCOSE, UA: ABNORMAL
SL AMB LEUKOCYTE ESTERASE,UA: ABNORMAL
SL AMB POCT BILIRUBIN,UA: ABNORMAL
SL AMB POCT BLOOD,UA: ABNORMAL
SL AMB POCT CLARITY,UA: CLEAR
SL AMB POCT COLOR,UA: YELLOW
SL AMB POCT KETONES,UA: ABNORMAL
SL AMB POCT NITRITE,UA: ABNORMAL
SL AMB POCT PH,UA: 7.5
SL AMB POCT SPECIFIC GRAVITY,UA: 1.01
SL AMB POCT URINE PROTEIN: ABNORMAL
SL AMB POCT UROBILINOGEN: ABNORMAL

## 2018-11-14 PROCEDURE — 99244 OFF/OP CNSLTJ NEW/EST MOD 40: CPT | Performed by: UROLOGY

## 2018-11-14 PROCEDURE — 81002 URINALYSIS NONAUTO W/O SCOPE: CPT | Performed by: UROLOGY

## 2018-11-14 NOTE — PROGRESS NOTES
Referring Physician: Shavon Pyle MD  A copy of this note was sent to the referring physician  Diagnoses and all orders for this visit:    Kidney stones  -     POCT urine dip  -     XR abdomen 1 view kub; Future  -     US kidney and bladder; Future  -     Ambulatory referral to Nephrology; Future  -     XR abdomen 1 view kub; Future            Assessment and plan:       1  Recurrent nephrolithiasis    Annika De La Cruz underwent 2 CT scans in the past 6 months  Most recent 3 months ago revealed a 3 mm UVJ calculus as well as a 2 mm left intrarenal calculus  He presents today with 48 hr of flank pain which has now resolved he was also found to have microscopic hematuria  He is currently asymptomatic    I recommended restaging his stone burden with a KUB renal ultrasound  He will be contacted if there any acute findings require further evaluation  This is unlikely based on his minimal stone burden on prior CT In addition I placed a referral to Nephrology for metabolic evaluation and we will see him back in 6 months with an updated KUB at that time      Dakotah Jacobo MD      Chief Complaint     Flank pain      History of Present Illness     Mary Anne Song is a 32 y o  male referred for evaluation of nephrolithiasis and flank pain  Patient has been evaluated in the emergency department twice over the past 6 months for stone disease  Most recently in August he was found to have a 3 mm right UVJ calculus which he subsequently passed  He was also found to have a 2 mm left intrarenal calculus in the lower pole  48 hr ago he presented to urgent care with right flank pain, left lower back pain  He was found to have microscopic hematuria  He was referred for evaluation today  At the present time he is minimally symptomatic    He describes dull vague back pain and some dysuria    Detailed Urologic History     - please refer to HPI    Review of Systems     Review of Systems   Constitutional: Negative for activity change and fatigue  HENT: Negative for congestion  Eyes: Negative for visual disturbance  Respiratory: Negative for shortness of breath and wheezing  Cardiovascular: Negative for chest pain and leg swelling  Gastrointestinal: Negative for abdominal pain  Endocrine: Positive for polyuria  Genitourinary: Positive for dysuria, flank pain and hematuria  Negative for urgency  Musculoskeletal: Negative for back pain  Allergic/Immunologic: Negative for immunocompromised state  Neurological: Negative for dizziness and numbness  Psychiatric/Behavioral: Negative for dysphoric mood  All other systems reviewed and are negative  Allergies     Allergies   Allergen Reactions    Sulfa Antibiotics Rash       Physical Exam     Physical Exam   Constitutional: He is oriented to person, place, and time  He appears well-developed and well-nourished  No distress  HENT:   Head: Normocephalic and atraumatic  Eyes: EOM are normal    Neck: Normal range of motion  Cardiovascular:   Negative lower extremity edema   Pulmonary/Chest: Effort normal and breath sounds normal    Abdominal: Soft  Genitourinary:   Genitourinary Comments: Negative CVA tenderness   Musculoskeletal: Normal range of motion  Neurological: He is alert and oriented to person, place, and time  Skin: Skin is warm  Psychiatric: He has a normal mood and affect   His behavior is normal            Vital Signs  Vitals:    11/14/18 1046   BP: 110/80   BP Location: Left arm   Patient Position: Sitting   Cuff Size: Adult   Pulse: 99   Weight: 69 1 kg (152 lb 6 4 oz)   Height: 5' 7" (1 702 m)         Current Medications       Current Outpatient Prescriptions:     metoclopramide (REGLAN) 10 mg tablet, Take 1 tablet (10 mg total) by mouth every 6 (six) hours, Disp: 30 tablet, Rfl: 0      Active Problems     Patient Active Problem List   Diagnosis    Lateral epicondylitis    Peptic disease    Renal colic on right side    Hematuria    Kidney stones         Past Medical History     Past Medical History:   Diagnosis Date    Abdominal pain, periumbilical 9/7/0296    Appendicitis 7/23/2016    Blepharitis of eyelid of left eye 6/30/2016    Eczema     Hypokalemia 9/2/2016    Leukocytosis 9/2/2016         Surgical History     Past Surgical History:   Procedure Laterality Date    GA LAP,APPENDECTOMY N/A 7/23/2016    Procedure: APPENDECTOMY LAPAROSCOPIC;  Surgeon: Alfonza Nyhan, DO;  Location[facility];  Service: General         Family History     Family History   Problem Relation Age of Onset    Nephrolithiasis Maternal Grandfather          Social History     Social History     History   Smoking Status    Never Smoker   Smokeless Tobacco    Never Used         Pertinent Lab Values     Lab Results   Component Value Date    CREATININE 1 24 08/14/2018       No results found for: PSA    @RESULTRCNT(1H])@      Pertinent Imaging     Personally viewed prior CTs as summarized in HPI    Portions of the record may have been created with voice recognition software   Occasional wrong word or "sound a like" substitutions may have occurred due to the inherent limitations of voice recognition software   Read the chart carefully and recognize, using context, where substitutions have occurred

## 2018-11-16 ENCOUNTER — HOSPITAL ENCOUNTER (OUTPATIENT)
Dept: RADIOLOGY | Facility: HOSPITAL | Age: 28
Discharge: HOME | End: 2018-11-16
Attending: UROLOGY
Payer: COMMERCIAL

## 2018-11-16 ENCOUNTER — HOSPITAL ENCOUNTER (OUTPATIENT)
Dept: ULTRASOUND IMAGING | Facility: HOSPITAL | Age: 28
Discharge: HOME | End: 2018-11-16
Attending: UROLOGY
Payer: COMMERCIAL

## 2018-11-16 DIAGNOSIS — N20.0 KIDNEY STONES: ICD-10-CM

## 2018-11-16 PROCEDURE — 76770 US EXAM ABDO BACK WALL COMP: CPT

## 2018-11-16 PROCEDURE — 74018 RADEX ABDOMEN 1 VIEW: CPT

## 2018-11-21 ENCOUNTER — TELEPHONE (OUTPATIENT)
Dept: UROLOGY | Facility: CLINIC | Age: 28
End: 2018-11-21

## 2018-11-21 DIAGNOSIS — N20.0 NEPHROLITHIASIS: Primary | ICD-10-CM

## 2018-11-21 NOTE — TELEPHONE ENCOUNTER
----- Message from Tiffanie Coleman MD sent at 11/21/2018  1:26 PM EST -----  Edit              Please let the patient know unfortunately it was not possible to determine if his stone has passed or not based on his x-ray and ultrasound      I have ordered a stone protocol CT scan    Let us make sure this is scheduled prior to his next follow-up visit

## 2018-11-21 NOTE — PROGRESS NOTES
Please let the patient know unfortunately it was not possible to determine if his stone has passed or not based on his x-ray and ultrasound  I have ordered a stone protocol CT scan    Let us make sure this is scheduled prior to his next follow-up visit

## 2018-11-21 NOTE — TELEPHONE ENCOUNTER
Called and left message for patient stating that per Ed Alegria he is unable to tell if stone has passed and that he needs to have a CT done  Left central scheduling's phone number in message  Patient is to call the office with any questions or concerns  Office number was left in the message

## 2018-11-26 ENCOUNTER — TELEPHONE (OUTPATIENT)
Dept: NEPHROLOGY | Facility: CLINIC | Age: 28
End: 2018-11-26

## 2018-11-26 NOTE — TELEPHONE ENCOUNTER
Called patient to inform him that we needed to reschedule his appointment as Dr Debi Holter will not be in the office tomorrow  Spoke with the patient's mother and she will have him call us to reschedule

## 2018-12-04 ENCOUNTER — OFFICE VISIT (OUTPATIENT)
Dept: NEPHROLOGY | Facility: CLINIC | Age: 28
End: 2018-12-04
Payer: COMMERCIAL

## 2018-12-04 VITALS
HEART RATE: 78 BPM | HEIGHT: 67 IN | BODY MASS INDEX: 23.7 KG/M2 | WEIGHT: 151 LBS | DIASTOLIC BLOOD PRESSURE: 86 MMHG | SYSTOLIC BLOOD PRESSURE: 122 MMHG

## 2018-12-04 DIAGNOSIS — N20.0 KIDNEY STONES: Primary | ICD-10-CM

## 2018-12-04 DIAGNOSIS — R31.21 ASYMPTOMATIC MICROSCOPIC HEMATURIA: ICD-10-CM

## 2018-12-04 LAB
SL AMB  POCT GLUCOSE, UA: NORMAL
SL AMB LEUKOCYTE ESTERASE,UA: NORMAL
SL AMB POCT BILIRUBIN,UA: NORMAL
SL AMB POCT BLOOD,UA: NORMAL
SL AMB POCT CLARITY,UA: CLEAR
SL AMB POCT COLOR,UA: YELLOW
SL AMB POCT KETONES,UA: NORMAL
SL AMB POCT NITRITE,UA: NORMAL
SL AMB POCT PH,UA: 6
SL AMB POCT SPECIFIC GRAVITY,UA: 1.01
SL AMB POCT URINE PROTEIN: NORMAL
SL AMB POCT UROBILINOGEN: 0.2

## 2018-12-04 PROCEDURE — 81002 URINALYSIS NONAUTO W/O SCOPE: CPT | Performed by: INTERNAL MEDICINE

## 2018-12-04 PROCEDURE — 99244 OFF/OP CNSLTJ NEW/EST MOD 40: CPT | Performed by: INTERNAL MEDICINE

## 2018-12-04 NOTE — PROGRESS NOTES
NEPHROLOGY OUTPATIENT CONSULTATION   Cheyenne Barajas 29 y o  male MRN: [id number]  Date: 12/4/2018  Reason for consultation:   Chief Complaint   Patient presents with    Consult    Nephrolithiasis       ASSESSMENT AND PLAN:  Recurrent nephrolithiasis  -patient was found to have renal stone on CT scan in 2016 1st time when it showed bilateral intrarenal calculi 1 to 2 mm in size  No ureteral calculi were found  Since then he has been having intermittent flank/back pain issues   -most recent CT scan in August 2018 shows 3 mm calculus at the right UVJ without significant hydronephrosis  2 mm left lower pole calculus was also noted   -patient is scheduled for repeat CT scan later this week  -workup in August 2018 shows serum creatinine 1 2, calcium 9 3, bicarb level 29  -will check 24 hour urine stone risk profile, urinalysis, urine microalbumin/creatinine ratio, PTH, phosphorus, BMP before next visit  -patient admits not to be drinking enough free water  I have strongly recommended him to increase fluid intake with goal urine output greater than 2 L per day  -salt restricted diet  Patient admits to be eating high salt diet  -low oxalate diet, education material provided  -no stone analysis available  -closely follows with Urology  -will make further recommendations based on 24 hour urine stone risk profile results  -urinalysis bland without hematuria or proteinuria in the office today  Urine pH 6    microscopic hematuria  -urinalysis in the office today shows no hematuria or proteinuria  Patient denies any gross hematuria episodes  -previous urinalysis in August 2018 showed microscopic hematuria which could be secondary to renal stones  -continue to monitor  Will do repeat urinalysis before next visit  Only recent creatinine value available 1 2 in August 2018  Prior creatinine 0 6 in 2016   -will do repeat BMP before next visit  Patient denies any obvious history of hypertension or diabetes  Denies any history of autoimmune disease  He says that he may have ? IBS although denies any obvious history of ulcerative colitis/Crohn's disease  He has been having intermittent stomach upset issues and occasional loose stool may concern for diarrhea predominant IBS  He has followed with GI in the past   Never had endoscopy done  HISTORY OF PRESENT ILLNESS:  Deana Marrufo is a 29y o  year old male without any significant medical history who presents for initial consultation for nephrolithiasis  Old medical records were reviewed  Patient was initially found to have bilateral intrarenal stones in 2016 based on CT scan although he was asymptomatic from renal stone perspective  Lately over last one year he started noticing intermittent flank/back pain  Repeat CT scan in August 2018 showed right UVJ calculus and left lower pole calculus  He also follows with Urology  He admits not to be drinking enough free water and also has high salt intake in his diet  He has never done 24 hour urine collection for stone risk profile before  Denies any history of ulcerative colitis or Crohn's disease  Denies any autoimmune conditions  Does not take any medications on a regular basis  Denies any recent NSAID use  His creatinine was 1 2 in August 2018 during the episode of kidney stones and flank pain  Denies any gross hematuria or any other urinary complaint  He does have stomach upset issues off and on  Family history includes grandfather having multiple kidney stones  REVIEW OF SYSTEMS:    More than 10 point review of systems were obtained and discussed in length with the patient  Complete review of systems were negative / unremarkable except mentioned in the HPI section        PHYSICAL EXAM:  Vitals:    12/04/18 1552 12/04/18 1633   BP: 110/66 122/86   BP Location: Right arm    Patient Position: Sitting    Cuff Size: Adult    Pulse: 78    Weight: 68 5 kg (151 lb)    Height: 5' 7" (1 702 m) Body mass index is 23 65 kg/m²  Physical Exam   Constitutional: He is oriented to person, place, and time  He appears well-developed and well-nourished  HENT:   Head: Normocephalic and atraumatic  Right Ear: External ear normal    Left Ear: External ear normal    Nose: Nose normal    Eyes: Pupils are equal, round, and reactive to light  Conjunctivae and EOM are normal  No scleral icterus  Neck: Neck supple  No JVD present  Cardiovascular: Normal rate and normal heart sounds  Pulmonary/Chest: Effort normal and breath sounds normal  No respiratory distress  He has no wheezes  He has no rales  Abdominal: Soft  Bowel sounds are normal  He exhibits no distension  There is no tenderness  Musculoskeletal: He exhibits no edema or tenderness  Neurological: He is alert and oriented to person, place, and time  Skin: Skin is warm and dry  Psychiatric: He has a normal mood and affect  His behavior is normal    Vitals reviewed        PAST MEDICAL HISTORY:  Past Medical History:   Diagnosis Date    Abdominal pain, periumbilical 6/7/5243    Appendicitis 7/23/2016    Blepharitis of eyelid of left eye 6/30/2016    Eczema     Hypokalemia 9/2/2016    Leukocytosis 9/2/2016       PAST SURGICAL HISTORY:  Past Surgical History:   Procedure Laterality Date    AR LAP,APPENDECTOMY N/A 7/23/2016    Procedure: APPENDECTOMY LAPAROSCOPIC;  Surgeon: Karin Colon DO;  Location: [facility];  Service: General       ALLERGIES:  Allergies   Allergen Reactions    Sulfa Antibiotics Rash       SOCIAL HISTORY:  History   Alcohol Use No     History   Drug Use No     History   Smoking Status    Never Smoker   Smokeless Tobacco    Never Used       FAMILY HISTORY:  Family History   Problem Relation Age of Onset    Nephrolithiasis Maternal Grandfather        MEDICATIONS:    Current Outpatient Prescriptions:     metoclopramide (REGLAN) 10 mg tablet, Take 1 tablet (10 mg total) by mouth every 6 (six) hours (Patient not taking: Reported on 12/4/2018 ), Disp: 30 tablet, Rfl: 0    Lab Results:   Results for orders placed or performed in visit on 12/04/18   POCT urine dip   Result Value Ref Range    LEUKOCYTE ESTERASE,UA NEG     NITRITE,UA NEG     SL AMB POCT UROBILINOGEN 0 2     POCT URINE PROTEIN NEG      PH,UA 6 0     BLOOD,UA NEG     SPECIFIC GRAVITY,UA 1 010     KETONES,UA NEG     BILIRUBIN,UA NEG     GLUCOSE, UA NEG      COLOR,UA YELLOW     CLARITY,UA CLEAR     Serum creatinine 1 2 in August 2018  Portions of the record may have been created with voice recognition software  Occasional wrong word or "sound a like" substitutions may have occurred due to the inherent limitations of voice recognition software  Read the chart carefully and recognize, using context, where substitutions have occurred

## 2018-12-04 NOTE — LETTER
December 4, 2018     Hussein Stephens MD  [address] [facility]    Patient: Ada Loredo   YOB: 1990   Date of Visit: 12/4/2018       Dear Dr Lee Hiss: Thank you for referring Margeret Romberg to me for evaluation  Below are my notes for this consultation  If you have questions, please do not hesitate to call me  I look forward to following your patient along with you  Sincerely,        Irene Morgan MD        CC: No Recipients  Irene Morgan MD  12/4/2018  4:56 PM  Sign at close encounter  [address] [facility],[address] 29 y o  male MRN: [id number]  Date: 12/4/2018  Reason for consultation:   Chief Complaint   Patient presents with    Consult    Nephrolithiasis       ASSESSMENT AND PLAN:  Recurrent nephrolithiasis  -patient was found to have renal stone on CT scan in 2016 1st time when it showed bilateral intrarenal calculi 1 to 2 mm in size  No ureteral calculi were found  Since then he has been having intermittent flank/back pain issues   -most recent CT scan in August 2018 shows 3 mm calculus at the right UVJ without significant hydronephrosis  2 mm left lower pole calculus was also noted   -patient is scheduled for repeat CT scan later this week  -workup in August 2018 shows serum creatinine 1 2, calcium 9 3, bicarb level 29  -will check 24 hour urine stone risk profile, urinalysis, urine microalbumin/creatinine ratio, PTH, phosphorus, BMP before next visit  -patient admits not to be drinking enough free water  I have strongly recommended him to increase fluid intake with goal urine output greater than 2 L per day  -salt restricted diet  Patient admits to be eating high salt diet  -low oxalate diet, education material provided  -no stone analysis available  -closely follows with Urology  -will make further recommendations based on 24 hour urine stone risk profile results    -urinalysis bland without hematuria or proteinuria in the office today  Urine pH 6    microscopic hematuria  -urinalysis in the office today shows no hematuria or proteinuria  Patient denies any gross hematuria episodes  -previous urinalysis in August 2018 showed microscopic hematuria which could be secondary to renal stones  -continue to monitor  Will do repeat urinalysis before next visit  Only recent creatinine value available 1 2 in August 2018  Prior creatinine 0 6 in 2016   -will do repeat BMP before next visit  Patient denies any obvious history of hypertension or diabetes  Denies any history of autoimmune disease  He says that he may have ? IBS although denies any obvious history of ulcerative colitis/Crohn's disease  He has been having intermittent stomach upset issues and occasional loose stool may concern for diarrhea predominant IBS  He has followed with GI in the past   Never had endoscopy done  HISTORY OF PRESENT ILLNESS:  Ludivina Rocha is a 29y o  year old male without any significant medical history who presents for initial consultation for nephrolithiasis  Old medical records were reviewed  Patient was initially found to have bilateral intrarenal stones in 2016 based on CT scan although he was asymptomatic from renal stone perspective  Lately over last one year he started noticing intermittent flank/back pain  Repeat CT scan in August 2018 showed right UVJ calculus and left lower pole calculus  He also follows with Urology  He admits not to be drinking enough free water and also has high salt intake in his diet  He has never done 24 hour urine collection for stone risk profile before  Denies any history of ulcerative colitis or Crohn's disease  Denies any autoimmune conditions  Does not take any medications on a regular basis  Denies any recent NSAID use  His creatinine was 1 2 in August 2018 during the episode of kidney stones and flank pain    Denies any gross hematuria or any other urinary complaint  He does have stomach upset issues off and on  Family history includes grandfather having multiple kidney stones  REVIEW OF SYSTEMS:    More than 10 point review of systems were obtained and discussed in length with the patient  Complete review of systems were negative / unremarkable except mentioned in the HPI section  PHYSICAL EXAM:  Vitals:    12/04/18 1552 12/04/18 1633   BP: 110/66 122/86   BP Location: Right arm    Patient Position: Sitting    Cuff Size: Adult    Pulse: 78    Weight: 68 5 kg (151 lb)    Height: 5' 7" (1 702 m)      Body mass index is 23 65 kg/m²  Physical Exam   Constitutional: He is oriented to person, place, and time  He appears well-developed and well-nourished  HENT:   Head: Normocephalic and atraumatic  Right Ear: External ear normal    Left Ear: External ear normal    Nose: Nose normal    Eyes: Pupils are equal, round, and reactive to light  Conjunctivae and EOM are normal  No scleral icterus  Neck: Neck supple  No JVD present  Cardiovascular: Normal rate and normal heart sounds  Pulmonary/Chest: Effort normal and breath sounds normal  No respiratory distress  He has no wheezes  He has no rales  Abdominal: Soft  Bowel sounds are normal  He exhibits no distension  There is no tenderness  Musculoskeletal: He exhibits no edema or tenderness  Neurological: He is alert and oriented to person, place, and time  Skin: Skin is warm and dry  Psychiatric: He has a normal mood and affect  His behavior is normal    Vitals reviewed        PAST MEDICAL HISTORY:  Past Medical History:   Diagnosis Date    Abdominal pain, periumbilical 9/4/4314    Appendicitis 7/23/2016    Blepharitis of eyelid of left eye 6/30/2016    Eczema     Hypokalemia 9/2/2016    Leukocytosis 9/2/2016       PAST SURGICAL HISTORY:  Past Surgical History:   Procedure Laterality Date    AR LAP,APPENDECTOMY N/A 7/23/2016    Procedure: APPENDECTOMY LAPAROSCOPIC;  [facility] ;  [facility];  Service: General       ALLERGIES:  Allergies   Allergen Reactions    Sulfa Antibiotics Rash       SOCIAL HISTORY:  History   Alcohol Use No     History   Drug Use No     History   Smoking Status    Never Smoker   Smokeless Tobacco    Never Used       FAMILY HISTORY:  Family History   Problem Relation Age of Onset    Nephrolithiasis Maternal Grandfather        MEDICATIONS:    Current Outpatient Prescriptions:     metoclopramide (REGLAN) 10 mg tablet, Take 1 tablet (10 mg total) by mouth every 6 (six) hours (Patient not taking: Reported on 12/4/2018 ), Disp: 30 tablet, Rfl: 0    Lab Results:   Results for orders placed or performed in visit on 12/04/18   POCT urine dip   Result Value Ref Range    LEUKOCYTE ESTERASE,UA NEG     NITRITE,UA NEG     SL AMB POCT UROBILINOGEN 0 2     POCT URINE PROTEIN NEG      PH,UA 6 0     BLOOD,UA NEG     SPECIFIC GRAVITY,UA 1 010     KETONES,UA NEG     BILIRUBIN,UA NEG     GLUCOSE, UA NEG      COLOR,UA YELLOW     CLARITY,UA CLEAR     Serum creatinine 1 2 in August 2018  Portions of the record may have been created with voice recognition software  Occasional wrong word or "sound a like" substitutions may have occurred due to the inherent limitations of voice recognition software  Read the chart carefully and recognize, using context, where substitutions have occurred

## 2018-12-04 NOTE — PATIENT INSTRUCTIONS
-salt restricted diet  -Low oxalate diet, and low protein diet  -24 our urine collection and blood test before next visit      Kidney Stones   WHAT YOU NEED TO KNOW:   Kidney stones form in the urinary system when the water and waste in your urine are out of balance  When this happens, certain types of waste crystals separate from the urine  The crystals build up and form kidney stones  You may have 1 or more kidney stones  DISCHARGE INSTRUCTIONS:   Return to the emergency department if:   · You have vomiting that is not relieved by medicine  Contact your healthcare provider if:   · You have a fever  · You have trouble passing urine  · You see blood in your urine  · You have severe pain  · You have any questions or concerns about your condition or care  Medicines:   · NSAIDs , such as ibuprofen, help decrease swelling, pain, and fever  This medicine is available with or without a doctor's order  NSAIDs can cause stomach bleeding or kidney problems in certain people  If you take blood thinner medicine, always ask your healthcare provider if NSAIDs are safe for you  Always read the medicine label and follow directions  · Prescription medicine  may be given  Ask how to take this medicine safely  · Medicines  to balance your electrolytes may be needed  · Take your medicine as directed  Contact your healthcare provider if you think your medicine is not helping or if you have side effects  Tell him or her if you are allergic to any medicine  Keep a list of the medicines, vitamins, and herbs you take  Include the amounts, and when and why you take them  Bring the list or the pill bottles to follow-up visits  Carry your medicine list with you in case of an emergency  Follow up with your healthcare provider as directed: You may need to return for more tests  Write down your questions so you remember to ask them during your visits  Self-care:   · Drink plenty of liquids    Your healthcare provider may tell you to drink at least 8 to 12 (eight-ounce) cups of liquids each day  This helps flush out the kidney stones when you urinate  Water is the best liquid to drink  · Strain your urine every time you go to the bathroom  Urinate through a strainer or a piece of thin cloth to catch the stones  Take the stones to your healthcare provider so they can be sent to the lab for tests  This will help your healthcare providers plan the best treatment for you  · Eat a variety of healthy foods  Healthy foods include fruits, vegetables, whole-grain breads, low-fat dairy products, beans, and fish  You may need to limit how much sodium (salt) or protein you eat  Ask for information about the best foods for you  · Stay active  Your stones may pass more easily by if you stay active  Ask about the best activities for you  After you pass your kidney stones:  Once you have passed your kidney stones, your healthcare provider may  order a 24-hour urine test  Results from a 24-hour urine test will help your healthcare provider plan ways to prevent more stones from forming  If you are told to do a 24-hour test, your healthcare provider will give you more instructions  © 2017 [address]  Information is for End User's use only and may not be sold, redistributed or otherwise used for commercial purposes  All illustrations and images included in CareNotes® are the copyrighted property of [employer]  or Sesar Stover  The above information is an  only  It is not intended as medical advice for individual conditions or treatments  Talk to your doctor, nurse or pharmacist before following any medical regimen to see if it is safe and effective for you  Low-Sodium Diet   WHAT YOU NEED TO KNOW:   A low-sodium diet limits foods that are high in sodium (salt)  You will need to follow a low-sodium diet if you have high blood pressure, kidney disease, or heart failure  You may also need to follow this diet if you have a condition that is causing your body to retain (hold) extra fluid  You may need to limit the amount of sodium you eat to 1,500 mg  Ask your healthcare provider how much sodium you can have each day  DISCHARGE INSTRUCTIONS:   How to use food labels to choose foods that are low in sodium:  Read food labels to find the amount of sodium they contain  The amount of sodium is listed in milligrams (mg)  The % Daily Value (DV) column tells you how much of your daily needs are met by 1 serving of the food for each nutrient listed  Choose foods that have less than 5% of the DV of sodium  These foods are considered low in sodium  Foods that have 20% or more of the DV of sodium are considered high in sodium  Some food labels may also list any of the following terms that tell you about the sodium content in the food:  · Sodium-free:  Less than 5 mg in each serving    · Very low sodium:  35 mg of sodium or less in each serving    · Low sodium:  140 mg of sodium or less in each serving    · Reduced sodium: At least 25% less sodium in each serving than the regular type    · Light in sodium:  50% less sodium in each serving    · Unsalted or no added salt:  No extra salt is added during processing (the food may still contain sodium)  Foods to avoid:  Salty foods are high in sodium   You should avoid the following:  · Processed foods:      ¨ Mixes for cornbread, biscuits, cake, and pudding     ¨ Instant foods, such as potatoes, cereals, noodles, and rice     ¨ Packaged foods, such as bread stuffing, rice and pasta mixes, snack dip mixes, and macaroni and cheese     ¨ Canned foods, such as canned vegetables, soups, broths, sauces, and vegetable or tomato juice    ¨ Snack foods, such as salted chips, popcorn, pretzels, pork rinds, salted crackers, and salted nuts    ¨ Frozen foods, such as dinners, entrees, vegetables with sauces, and breaded meats    ¨ Sauerkraut, pickled vegetables, and other foods prepared in brine    · Meats and cheeses:      ¨ Smoked or cured meat, such as corned beef, rogers, ham, hot dogs, and sausage    ¨ Canned meats or spreads, such as potted meats, sardines, anchovies, and imitation seafood    ¨ Deli or lunch meats, such as bologna, ham, turkey, and roast beef    ¨ Processed cheese, such as American cheese and cheese spreads    · Condiments, sauces, and seasonings:      ¨ Salt (¼ teaspoon of salt contains 575 mg of sodium)    ¨ Seasonings made with salt, such as garlic salt, celery salt, onion salt, and seasoned salt    ¨ Regular soy sauce, barbecue sauce, teriyaki sauce, steak sauce, Worcestershire sauce, and most flavored vinegars    ¨ Canned gravy and mixes     ¨ Regular condiments, such as mustard, ketchup, and salad dressings    ¨ Pickles and olives    ¨ Meat tenderizers and monosodium glutamate (MSG)  Foods to include:  Read the food label to find the amount of sodium in each serving  · Bread and cereal:  Try to choose breads with less than 80 mg of sodium per serving  ¨ Bread, roll, judi, tortilla, or unsalted crackers  ¨ Ready-to-eat cereals with less than 5% DV of sodium (examples include shredded wheat and puffed rice)    ¨ Pasta    · Vegetables and fruits:      ¨ Unsalted fresh, frozen, or canned vegetables    ¨ Fresh, frozen, or canned fruits    ¨ Fruit juice    · Dairy:  One serving has about 150 mg of sodium  ¨ Milk, all types    ¨ Yogurt    ¨ Hard cheese, such as cheddar, Swiss, [employer], or mozzarella    · Meat and other protein foods:  Some raw meats may have added sodium  ¨ Plain meats, fish, and poultry     ¨ Egg    · Other foods:      ¨ Homemade pudding    ¨ Unsalted nuts, popcorn, or pretzels    ¨ Unsalted butter or margarine  Ways to decrease sodium:   · Add spices and herbs to foods instead of salt during cooking  Use salt-free seasonings to add flavor to foods   Examples include onion powder, garlic powder, basil, richardson powder, paprika, and parsley  Try lemon or lime juice or vinegar to give foods a tart flavor  Use hot peppers, pepper, or cayenne pepper to add a spicy flavor to foods  · Do not keep a salt shaker at your kitchen table  This may help keep you from adding salt to food at the table  It may take time to get used to enjoying the natural flavor of food instead of adding salt  Talk to your healthcare provider before you use salt substitutes  Some salt substitutes have a high amount of potassium and need to be avoided if you have kidney disease  · Choose low-sodium foods at restaurants  Meals from restaurants are often high in sodium  Some restaurants have nutrition information on the menu that tells you the amount of sodium in their foods  If possible, ask for your food to be prepared with less, or no salt  · Shop for unsalted or low-sodium foods and snacks at the grocery store  Examples include unsalted or low-sodium broths, soups, and canned vegetables  Choose fresh or frozen vegetables instead  Choose unsalted nuts or seeds or fresh fruits or vegetables as snacks  Read food labels and choose salt-free, very low-sodium, or low-sodium foods  © 2017 [address]  Information is for End User's use only and may not be sold, redistributed or otherwise used for commercial purposes  All illustrations and images included in CareNotes® are the copyrighted property of [employer]  or [employer]esar [employer]over  The above information is an  only  It is not intended as medical advice for individual conditions or treatments  Talk to your doctor, nurse or pharmacist before following any medical regimen to see if it is safe and effective for you  Low Oxalate Diet   WHAT YOU NEED TO KNOW:   A low-oxalate diet is a meal plan that is low in oxalate  Oxalate is a chemical found in plant foods   You may need to eat foods that are low in oxalate to help clear kidney stones or prevent them from forming  People who have had kidney stones are at a higher risk of forming kidney stones again  The most common type of kidney stone is made up of crystals that contain calcium and oxalate  Your healthcare provider or dietitian may recommend that you limit oxalate if you get this type of kidney stone often  DISCHARGE INSTRUCTIONS:   Foods to include: Include the following foods that have a low to medium amount of oxalate    · Grains:      ¨ Egg noodles    ¨ Karthikeyan crackers    ¨ Pancakes and waffles    ¨ Cooked and dry cereals without nuts or bran    ¨ White or wild rice    ¨ White bread, cornbread, bagels, and white English muffins (medium oxalate)    ¨ Saltine or soda crackers and vanilla wafers (medium oxalate)    ¨ Brown rice, spaghetti, and other noodles and pastas (medium oxalate)    · Fruit:      ¨ Apples, bananas, grapes    ¨ Grapefruit and cranberries    ¨ Peaches, nectarines, apricots, and pears    ¨ Papayas and strawberries    ¨ Melons and pineapples    ¨ Blackberries, blueberries, mangoes, and prunes (medium oxalate)    · Vegetables:      ¨ Artichokes, asparagus, and brussels sprouts    ¨ Broccoli and cauliflower    ¨ Kale, endive, cabbage, and lettuce    ¨ Cucumbers, peas, and zucchini    ¨ Mushrooms, onions, and peppers    ¨ Potatoes and corn    ¨ Carrots, celery, and green beans (medium oxalate)    ¨ Parsnips, summer squash, tomatoes, and turnips (medium oxalate)    · Dairy:      ¨ American cheese, Swiss cheese, cottage cheese, ricotta cheese, and cheddar cheese    ¨ Milk and buttermilk    ¨ Yogurt    · Protein foods:      ¨ Meat, fish, shellfish, chicken, and turkey    [place] meat and ham (medium oxalate)    ¨ Hot dogs, bratwurst, rogers, and sausage (medium oxalate)    · Drinks and desserts:      ¨ Coffee    ¨ Fruit punch and lemonade or limeade without added vitamin C    · Desserts:      ¨ Cookies, cakes, and ice cream    ¨ Pudding without chocolate  Foods to limit or avoid:  Limit the following foods that are high in oxalate  · Grains:      ¨ Wheat bran, wheat germ, and barley    ¨ Grits and bran cereal    ¨ White corn flour and buckwheat flour    ¨ Whole wheat bread    · Fruit:      ¨ Dried apricots    ¨ Red currants, figs, and rhubarb    ¨ Kiwi    · Vegetables:      ¨ [place] greens, leeks, okra, and spinach    ¨ Wax beans     ¨ Eggplant    ¨ Beets and beet greens    ¨ Swiss chard, escarole, parsley, and rutabagas    ¨ Tomato paste    · Protein foods:      ¨ Baked beans with tomato sauce    ¨ Nut butters and nuts (peanuts, almonds, pecans, cashews, hazelnuts)    ¨ Soy burgers    ¨ Miso    ¨ Dried beans    · Desserts:      ¨ Fruitcake    ¨ Chocolate    ¨ Carob and marmalade    · Beverages:      ¨ Chocolate drink mixes    ¨ Soy milk    ¨ Instant iced tea    · Other foods:      ¨ Sesame seeds and tahini (paste made of sesame seeds)    ¨ Poppy seeds  Other dietary guidelines:   · Drink about 12 to 16 (eight-ounce) cups of liquid each day  Liquids help clear kidney stones and prevent them from forming again  Water is the best liquid to drink  You may need more liquid if you are physically active  Ask your healthcare provider or dietitian how much liquid you need to drink each day  · Your healthcare provider may suggest that you make other diet changes to help prevent kidney stones  This may include decreasing the amount of sodium you eat each day  © 2017 [address] Information is for End User's use only and may not be sold, redistributed or otherwise used for commercial purposes  All illustrations and images included in CareNotes® are the copyrighted property of [employer]  or [employer]esar [employer]over  The above information is an  only  It is not intended as medical advice for individual conditions or treatments  Talk to your doctor, nurse or pharmacist before following any medical regimen to see if it is safe and effective for you

## 2018-12-07 ENCOUNTER — HOSPITAL ENCOUNTER (OUTPATIENT)
Dept: CT IMAGING | Facility: HOSPITAL | Age: 28
Discharge: HOME | End: 2018-12-07
Attending: UROLOGY
Payer: COMMERCIAL

## 2018-12-07 DIAGNOSIS — N20.0 NEPHROLITHIASIS: ICD-10-CM

## 2018-12-07 PROCEDURE — 74176 CT ABD & PELVIS W/O CONTRAST: CPT

## 2018-12-11 ENCOUNTER — APPOINTMENT (OUTPATIENT)
Dept: LAB | Facility: CLINIC | Age: 28
End: 2018-12-11
Payer: COMMERCIAL

## 2018-12-11 DIAGNOSIS — N20.0 KIDNEY STONES: ICD-10-CM

## 2018-12-11 PROCEDURE — 84300 ASSAY OF URINE SODIUM: CPT

## 2018-12-11 PROCEDURE — 84392 ASSAY OF URINE SULFATE: CPT

## 2018-12-11 PROCEDURE — 83735 ASSAY OF MAGNESIUM: CPT

## 2018-12-11 PROCEDURE — 83935 ASSAY OF URINE OSMOLALITY: CPT

## 2018-12-11 PROCEDURE — 84133 ASSAY OF URINE POTASSIUM: CPT

## 2018-12-11 PROCEDURE — 82131 AMINO ACIDS SINGLE QUANT: CPT

## 2018-12-11 PROCEDURE — 82507 ASSAY OF CITRATE: CPT

## 2018-12-11 PROCEDURE — 83945 ASSAY OF OXALATE: CPT

## 2018-12-11 PROCEDURE — 84105 ASSAY OF URINE PHOSPHORUS: CPT

## 2018-12-11 PROCEDURE — 84560 ASSAY OF URINE/URIC ACID: CPT

## 2018-12-11 PROCEDURE — 82140 ASSAY OF AMMONIA: CPT

## 2018-12-11 PROCEDURE — 81003 URINALYSIS AUTO W/O SCOPE: CPT

## 2018-12-11 PROCEDURE — 82340 ASSAY OF CALCIUM IN URINE: CPT

## 2018-12-11 PROCEDURE — 82570 ASSAY OF URINE CREATININE: CPT

## 2018-12-11 PROCEDURE — 82436 ASSAY OF URINE CHLORIDE: CPT

## 2018-12-13 ENCOUNTER — TELEPHONE (OUTPATIENT)
Dept: OTHER | Facility: HOSPITAL | Age: 28
End: 2018-12-13

## 2018-12-13 ENCOUNTER — APPOINTMENT (OUTPATIENT)
Dept: LAB | Facility: CLINIC | Age: 28
End: 2018-12-13
Payer: COMMERCIAL

## 2018-12-13 DIAGNOSIS — N20.0 KIDNEY STONES: ICD-10-CM

## 2018-12-13 LAB
ANION GAP SERPL CALCULATED.3IONS-SCNC: 10 MMOL/L (ref 4–13)
BILIRUB UR QL STRIP: NEGATIVE
BUN SERPL-MCNC: 14 MG/DL (ref 5–25)
CALCIUM SERPL-MCNC: 9.6 MG/DL (ref 8.3–10.1)
CHLORIDE SERPL-SCNC: 103 MMOL/L (ref 100–108)
CLARITY UR: CLEAR
CO2 SERPL-SCNC: 28 MMOL/L (ref 21–32)
COLOR UR: YELLOW
CREAT SERPL-MCNC: 0.9 MG/DL (ref 0.6–1.3)
CREAT UR-MCNC: 293 MG/DL
GFR SERPL CREATININE-BSD FRML MDRD: 116 ML/MIN/1.73SQ M
GLUCOSE SERPL-MCNC: 97 MG/DL (ref 65–140)
GLUCOSE UR STRIP-MCNC: NEGATIVE MG/DL
HGB UR QL STRIP.AUTO: NEGATIVE
KETONES UR STRIP-MCNC: NEGATIVE MG/DL
LEUKOCYTE ESTERASE UR QL STRIP: NEGATIVE
MAGNESIUM SERPL-MCNC: 1.9 MG/DL (ref 1.6–2.6)
MICROALBUMIN UR-MCNC: 19 MG/L (ref 0–20)
MICROALBUMIN/CREAT 24H UR: 6 MG/G CREATININE (ref 0–30)
NITRITE UR QL STRIP: NEGATIVE
PH UR STRIP.AUTO: 6 [PH] (ref 4.5–8)
PHOSPHATE SERPL-MCNC: 3.3 MG/DL (ref 2.7–4.5)
POTASSIUM SERPL-SCNC: 3.5 MMOL/L (ref 3.5–5.3)
PROT UR STRIP-MCNC: NEGATIVE MG/DL
PTH-INTACT SERPL-MCNC: 34.6 PG/ML (ref 18.4–80.1)
SODIUM SERPL-SCNC: 141 MMOL/L (ref 136–145)
SP GR UR STRIP.AUTO: 1.02 (ref 1–1.03)
URATE SERPL-MCNC: 6 MG/DL (ref 4.2–8)
UROBILINOGEN UR QL STRIP.AUTO: 0.2 E.U./DL

## 2018-12-13 PROCEDURE — 82570 ASSAY OF URINE CREATININE: CPT

## 2018-12-13 PROCEDURE — 83970 ASSAY OF PARATHORMONE: CPT

## 2018-12-13 PROCEDURE — 80048 BASIC METABOLIC PNL TOTAL CA: CPT

## 2018-12-13 PROCEDURE — 82043 UR ALBUMIN QUANTITATIVE: CPT

## 2018-12-13 PROCEDURE — 84100 ASSAY OF PHOSPHORUS: CPT

## 2018-12-13 PROCEDURE — 36415 COLL VENOUS BLD VENIPUNCTURE: CPT

## 2018-12-13 PROCEDURE — 81003 URINALYSIS AUTO W/O SCOPE: CPT

## 2018-12-13 PROCEDURE — 83735 ASSAY OF MAGNESIUM: CPT

## 2018-12-13 PROCEDURE — 84550 ASSAY OF BLOOD/URIC ACID: CPT

## 2018-12-13 NOTE — TELEPHONE ENCOUNTER
Can you let him know that his serum creatinine has improved to 0 9 from prior value 1 2    All other labs look normal

## 2018-12-14 ENCOUNTER — TELEPHONE (OUTPATIENT)
Dept: UROLOGY | Facility: CLINIC | Age: 28
End: 2018-12-14

## 2018-12-14 NOTE — TELEPHONE ENCOUNTER
Left message for patient on cell and home to call office to go over recent testing results and recommendation  Provided main number to call back

## 2018-12-14 NOTE — TELEPHONE ENCOUNTER
Please let the patient know that his CT scan shows that he has not passed his kidney stone  It has remain in place since August on imaging and although he is asymptomatic, surgical intervention with ureteroscopy is recommended  Ericka - please let the patient know and schedule a preoperative visit with myself or AP to plan for ureteroscopy at [facility]      Trecia Goldmann please schedule surgery

## 2018-12-14 NOTE — TELEPHONE ENCOUNTER
Patient managed by Hilda Padron at the [facility] office  Called and left message for patient to call back

## 2018-12-17 NOTE — TELEPHONE ENCOUNTER
Spoke to patient,   Advised him surgical scheduler is holding 1/11/19 for surgery,  Offered to reschedule sooner for follow up appt/ [facility]  Patient states the 1/4/19 appt works well for him with his job  He is traveling for the holidays  12/28/18- 1/2/19

## 2018-12-17 NOTE — TELEPHONE ENCOUNTER
Please enter surgery order for ureteroscopy with Dr Maryuri Palmer on 1/11/19  I don't know laterality  He's scheduled to see you on 1/4/19  Thank you

## 2018-12-17 NOTE — TELEPHONE ENCOUNTER
Attempted to reach patient back,  Need to make correction on tentative surgery date,  Surgery is tentatively scheduled for 1/11/19  Request patient call back to review

## 2018-12-17 NOTE — TELEPHONE ENCOUNTER
Called and spoke to patient  Advised him of recommendation by Dr Leticia Lai, that his CT scan shows he has not yet passed kidney stone that was present on imaging from August and recommendation is to be scheduled for surgery   Advised him recommended to be scheduled for follow up visit to discuss surgery  Advised him tentatively scheduled surgery for 1/19/19  Follow up was scheduled for 1/4/18 at 11:30 Am in [facility] with Kayode Dues

## 2018-12-21 LAB
AMMONIA 24H UR-MRATE: 13 MEQ/24 HR
AMMONIA UR-SCNC: ABNORMAL UG/DL
CA H2 PHOS DIHYD CRY URNS QL MICRO: 2.15 RATIO (ref 0–3)
CALCIUM 24H UR-MCNC: 6.5 MG/DL
CALCIUM 24H UR-MRATE: 27.6 MG/24 HR (ref 100–300)
CHLORIDE 24H UR-SCNC: 141 MMOL/L
CHLORIDE 24H UR-SRATE: 60 MMOL/24 HR (ref 110–250)
CITRATE 24H UR-MCNC: 126 MG/L
CITRATE 24H UR-MRATE: 54 MG/24 HR (ref 320–1240)
COM CRY STONE QL IR: 1.05 RATIO (ref 0–6)
CREAT 24H UR-MCNC: 135.2 MG/DL
CREAT 24H UR-MRATE: 574.6 MG/24 HR (ref 1000–2000)
CYSTINE 24H UR-MCNC: 5.55 MG/L
CYSTINE 24H UR-MRATE: 2.36 MG/24 HR (ref 10–100)
MAGNESIUM 24H UR-MRATE: 11 MG/24 HR (ref 12–293)
MAGNESIUM UR-MCNC: 2.5 MG/DL
NA URATE CRY STONE QL IR: 11.07 RATIO (ref 0–4)
OSMOLALITY UR: 722 MOSMOL/KG (ref 300–900)
OXALATE 24H UR-MRATE: 3 MG/24 HR (ref 7–44)
OXALATE UR-MCNC: 8 MG/L
PH 24H UR: 6.6 [PH]
PHOSPHATE 24H UR-MCNC: 84 MG/DL
PHOSPHATE 24H UR-MRATE: 357 MG/24 HR (ref 400–1300)
PLEASE NOTE (STONE RISK): ABNORMAL
POTASSIUM 24H UR-SCNC: 11.5 MMOL/24 HR (ref 25–125)
POTASSIUM UR-SCNC: 27.1 MMOL/L
PRESERVED URINE: 425 ML/24 HR (ref 800–1800)
SODIUM 24H UR-SCNC: 180 MMOL/L
SODIUM 24H UR-SRATE: 77 MMOL/24 HR (ref 58–337)
SPECIMEN VOL 24H UR: 425 ML/24 HR (ref 800–1800)
SULFATE 24H UR-MCNC: 15 MEQ/24 HR (ref 0–30)
SULFATE UR-MCNC: 36 MEQ/L
TRI-PHOS CRY STONE MICRO: 0.13 RATIO (ref 0–1)
URATE 24H UR-MCNC: 53.8 MG/DL
URATE 24H UR-MRATE: 229 MG/24 HR (ref 250–750)
URATE DIHYD CRY STONE QL IR: 0.64 RATIO (ref 0–1.2)

## 2018-12-26 ENCOUNTER — TELEPHONE (OUTPATIENT)
Dept: NEPHROLOGY | Facility: CLINIC | Age: 28
End: 2018-12-26

## 2018-12-26 DIAGNOSIS — R82.991 HYPOCITRATURIA: Primary | ICD-10-CM

## 2018-12-26 DIAGNOSIS — N20.0 NEPHROLITHIASIS: ICD-10-CM

## 2018-12-26 RX ORDER — POTASSIUM CITRATE 15 MEQ/1
1 TABLET, EXTENDED RELEASE ORAL DAILY
Qty: 30 TABLET | Refills: 5 | Status: SHIPPED | OUTPATIENT
Start: 2018-12-26 | End: 2020-04-01 | Stop reason: SDUPTHER

## 2018-12-26 NOTE — TELEPHONE ENCOUNTER
Can you let him know that his 24 hr urine stone risk profile shows significantly lower urine volume 425 mL  His goal urine output is greater than 2 L per day  He needs to drink plenty of free water to increase his urine output  Also urine citrate level remains low and I will start him on potassium citrate 15 mEq one tablet daily which I have prescribed to his [facility]

## 2018-12-27 NOTE — TELEPHONE ENCOUNTER
Spoke with the patient and he wants to know if he has to start the medication because the kidney stone that he has had has been the same stone for 3 year and that he is not producing any new stones

## 2019-01-04 ENCOUNTER — OFFICE VISIT (OUTPATIENT)
Dept: UROLOGY | Facility: CLINIC | Age: 29
End: 2019-01-04
Payer: COMMERCIAL

## 2019-01-04 VITALS
SYSTOLIC BLOOD PRESSURE: 118 MMHG | DIASTOLIC BLOOD PRESSURE: 82 MMHG | WEIGHT: 152.4 LBS | BODY MASS INDEX: 23.92 KG/M2 | HEART RATE: 98 BPM | HEIGHT: 67 IN

## 2019-01-04 DIAGNOSIS — R35.0 URINARY FREQUENCY: Primary | ICD-10-CM

## 2019-01-04 DIAGNOSIS — N20.1 URETERAL STONE: ICD-10-CM

## 2019-01-04 PROCEDURE — 99213 OFFICE O/P EST LOW 20 MIN: CPT | Performed by: PHYSICIAN ASSISTANT

## 2019-01-04 PROCEDURE — 87086 URINE CULTURE/COLONY COUNT: CPT | Performed by: PHYSICIAN ASSISTANT

## 2019-01-04 NOTE — PROGRESS NOTES
1  Urinary frequency  Urine culture   2  Ureteral stone  Case request operating room: CYSTOSCOPY URETEROSCOPY WITH LITHOTRIPSY HOLMIUM LASER, RETROGRADE PYELOGRAM AND INSERTION STENT URETERAL    Case request operating room: CYSTOSCOPY URETEROSCOPY WITH LITHOTRIPSY HOLMIUM LASER, RETROGRADE PYELOGRAM AND INSERTION STENT URETERAL         Assessment and plan:       1  Nephrolithiasis - managed by Dr Kathrine Beverly   - reviewed with the patient that he has retained ureteral calculus  We discussed these like asymptomatic at this time as there is minimal hydronephrosis  Reviewed the risks of retained ureteral calculus including urinary infection, pyelonephritis, hydronephrosis and renal failure  - recommendations are to proceed with cystoscopy, right ureteroscopy, holmium laser, basket extraction retrograde pyelogram, and right ureteral insertion for his retained ureteral calculus  Risks of the procedure reviewed in the office today and patient verbalized understanding  These will be reviewed preoperatively by the surgeon  This will be scheduled in near future  He is aware to remain NPO after midnight  Patient is aware to contact us in the interim with any urologic concern  All questions answered  Bravo Welch PA-C      Chief Complaint     Chief Complaint   Patient presents with    Nephrolithiasis         History of Present Illness     Wil Solares is a 29 y o  male patient of Dr Kathrine Beverly with a history of nephrolithiasis presenting for follow-up  Patient has a history of recurrent nephrolithiasis  He was referred to Nephrology for metabolic stone evaluation  Patient's 24 hour urine study urine output for the later  Patient also 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 and was initiated on potassium citrate mEq tablet daily by nephrologist       Patient had a recent ultrasound of the kidney and bladder (11/16/2018) which revealed a left lower pole 4 mm nonobstructing stone    There is no hydronephrosis  Left ureteral jet was not detected  A CT stone study obtained for further evaluation (12/07/2018) which did reveal a 4 mm calculus at the right UVJ  There is no significant right hydronephrosis noted at that time  This was felt to be the same stone from August 2018  There was a 3 mm nonobstructing stone in the left lower pole as well  Patient has been doing well over the past few weeks  He denies any dysuria, gross hematuria, suprapubic pressure, flank pain, nausea, vomiting, fevers, or chills  Denies any interval passage of stones  Laboratory     Lab Results   Component Value Date    CREATININE 0 90 12/13/2018       Review of Systems     Review of Systems   Constitutional: Negative for activity change, appetite change, chills, diaphoresis, fatigue, fever and unexpected weight change  Respiratory: Negative for chest tightness and shortness of breath  Cardiovascular: Negative for chest pain, palpitations and leg swelling  Gastrointestinal: Negative for abdominal distention, abdominal pain, constipation, diarrhea, nausea and vomiting  Genitourinary: Negative for decreased urine volume, difficulty urinating, dysuria, enuresis, flank pain, frequency, genital sores, hematuria and urgency  Musculoskeletal: Negative for back pain, gait problem and myalgias  Skin: Negative for color change, pallor, rash and wound  Psychiatric/Behavioral: Negative for behavioral problems  The patient is not nervous/anxious  Allergies     Allergies   Allergen Reactions    Sulfa Antibiotics Rash       Physical Exam     Physical Exam   Constitutional: He is oriented to person, place, and time  He appears well-developed and well-nourished  No distress  HENT:   Head: Normocephalic and atraumatic  Eyes: Conjunctivae are normal    Neck: Normal range of motion  No tracheal deviation present  Cardiovascular: Normal rate, regular rhythm and normal heart sounds    Exam reveals no gallop and no friction rub  No murmur heard  Pulmonary/Chest: Effort normal and breath sounds normal  No respiratory distress  He has no wheezes  He has no rales  Musculoskeletal: Normal range of motion  He exhibits no edema or deformity  Neurological: He is alert and oriented to person, place, and time  Skin: Skin is warm and dry  No rash noted  He is not diaphoretic  No erythema  Psychiatric: He has a normal mood and affect   His behavior is normal          Vital Signs     Vitals:    01/04/19 1134   BP: 118/82   BP Location: Left arm   Patient Position: Sitting   Cuff Size: Adult   Pulse: 98   Weight: 69 1 kg (152 lb 6 4 oz)   Height: 5' 7" (1 702 m)         Current Medications       Current Outpatient Prescriptions:     metoclopramide (REGLAN) 10 mg tablet, Take 1 tablet (10 mg total) by mouth every 6 (six) hours, Disp: 30 tablet, Rfl: 0    Potassium Citrate ER 15 MEQ (1620 MG) TBCR, Take 1 tablet by mouth daily, Disp: 30 tablet, Rfl: 5      Active Problems     Patient Active Problem List   Diagnosis    Lateral epicondylitis    Peptic disease    Renal colic on right side    Hematuria    Kidney stones     Past Medical History     Past Medical History:   Diagnosis Date    Abdominal pain, periumbilical 8/9/3390    Appendicitis 7/23/2016    Blepharitis of eyelid of left eye 6/30/2016    Eczema     Hypokalemia 9/2/2016    Leukocytosis 9/2/2016     Surgical History     Past Surgical History:   Procedure Laterality Date    MS LAP,APPENDECTOMY N/A 7/23/2016    Procedure: APPENDECTOMY LAPAROSCOPIC;  Surgeon: Mckayla Sharma DO;  Location: [facility];  Service: General       Family History     Family History   Problem Relation Age of Onset    Nephrolithiasis Maternal Grandfather        Social History     Social History     Radiology

## 2019-01-05 LAB — BACTERIA UR CULT: NORMAL

## 2019-01-07 ENCOUNTER — TELEPHONE (OUTPATIENT)
Dept: NEPHROLOGY | Facility: CLINIC | Age: 29
End: 2019-01-07

## 2019-01-07 NOTE — TELEPHONE ENCOUNTER
Lab called to let you know that the UA that you ordered was not collected because no specimen was received so they had to cancel the order

## 2019-01-08 ENCOUNTER — ANESTHESIA EVENT (OUTPATIENT)
Dept: PERIOP | Facility: AMBULARY SURGERY CENTER | Age: 29
End: 2019-01-08
Payer: COMMERCIAL

## 2019-01-08 NOTE — PRE-PROCEDURE INSTRUCTIONS
Pre-Surgery Instructions:   Medication Instructions    Potassium Citrate ER 15 MEQ (1620 MG) TBCR Instructed patient per Anesthesia Guidelines      Pre op and showering instructions using an antibacterial soap reviewed

## 2019-01-11 ENCOUNTER — ANESTHESIA (OUTPATIENT)
Dept: PERIOP | Facility: AMBULARY SURGERY CENTER | Age: 29
End: 2019-01-11
Payer: COMMERCIAL

## 2019-01-11 ENCOUNTER — APPOINTMENT (OUTPATIENT)
Dept: RADIOLOGY | Facility: AMBULARY SURGERY CENTER | Age: 29
End: 2019-01-11
Payer: COMMERCIAL

## 2019-01-11 ENCOUNTER — TELEPHONE (OUTPATIENT)
Dept: UROLOGY | Facility: CLINIC | Age: 29
End: 2019-01-11

## 2019-01-11 ENCOUNTER — HOSPITAL ENCOUNTER (OUTPATIENT)
Facility: AMBULARY SURGERY CENTER | Age: 29
Setting detail: OUTPATIENT SURGERY
Discharge: HOME | End: 2019-01-11
Attending: UROLOGY | Admitting: UROLOGY
Payer: COMMERCIAL

## 2019-01-11 VITALS
SYSTOLIC BLOOD PRESSURE: 135 MMHG | WEIGHT: 148 LBS | TEMPERATURE: 97.8 F | BODY MASS INDEX: 23.23 KG/M2 | RESPIRATION RATE: 18 BRPM | HEIGHT: 67 IN | OXYGEN SATURATION: 98 % | HEART RATE: 63 BPM | DIASTOLIC BLOOD PRESSURE: 92 MMHG

## 2019-01-11 DIAGNOSIS — N20.1 URETERAL STONE: ICD-10-CM

## 2019-01-11 PROCEDURE — C2617 STENT, NON-COR, TEM W/O DEL: HCPCS | Performed by: UROLOGY

## 2019-01-11 PROCEDURE — 82360 CALCULUS ASSAY QUANT: CPT | Performed by: UROLOGY

## 2019-01-11 PROCEDURE — C1769 GUIDE WIRE: HCPCS | Performed by: UROLOGY

## 2019-01-11 PROCEDURE — 52332 CYSTOSCOPY AND TREATMENT: CPT | Performed by: UROLOGY

## 2019-01-11 PROCEDURE — 88300 SURGICAL PATH GROSS: CPT | Performed by: PATHOLOGY

## 2019-01-11 PROCEDURE — 74420 UROGRAPHY RTRGR +-KUB: CPT

## 2019-01-11 PROCEDURE — 52352 CYSTOURETERO W/STONE REMOVE: CPT | Performed by: UROLOGY

## 2019-01-11 DEVICE — STENT URETERAL 6 FR 26CM INLAY OPTIMA: Type: IMPLANTABLE DEVICE | Site: URINARY BLADDER | Status: FUNCTIONAL

## 2019-01-11 RX ORDER — FENTANYL CITRATE 50 UG/ML
INJECTION, SOLUTION INTRAMUSCULAR; INTRAVENOUS AS NEEDED
Status: DISCONTINUED | OUTPATIENT
Start: 2019-01-11 | End: 2019-01-11 | Stop reason: SURG

## 2019-01-11 RX ORDER — DEXAMETHASONE SODIUM PHOSPHATE 4 MG/ML
INJECTION, SOLUTION INTRA-ARTICULAR; INTRALESIONAL; INTRAMUSCULAR; INTRAVENOUS; SOFT TISSUE AS NEEDED
Status: DISCONTINUED | OUTPATIENT
Start: 2019-01-11 | End: 2019-01-11 | Stop reason: SURG

## 2019-01-11 RX ORDER — MAGNESIUM HYDROXIDE 1200 MG/15ML
LIQUID ORAL AS NEEDED
Status: DISCONTINUED | OUTPATIENT
Start: 2019-01-11 | End: 2019-01-11 | Stop reason: HOSPADM

## 2019-01-11 RX ORDER — CEPHALEXIN 500 MG/1
500 CAPSULE ORAL EVERY 6 HOURS SCHEDULED
Qty: 3 CAPSULE | Refills: 0 | Status: SHIPPED | OUTPATIENT
Start: 2019-01-11 | End: 2019-01-12

## 2019-01-11 RX ORDER — ONDANSETRON 2 MG/ML
INJECTION INTRAMUSCULAR; INTRAVENOUS AS NEEDED
Status: DISCONTINUED | OUTPATIENT
Start: 2019-01-11 | End: 2019-01-11 | Stop reason: SURG

## 2019-01-11 RX ORDER — CEFAZOLIN SODIUM 1 G/3ML
INJECTION, POWDER, FOR SOLUTION INTRAMUSCULAR; INTRAVENOUS AS NEEDED
Status: DISCONTINUED | OUTPATIENT
Start: 2019-01-11 | End: 2019-01-11 | Stop reason: SURG

## 2019-01-11 RX ORDER — SODIUM CHLORIDE, SODIUM LACTATE, POTASSIUM CHLORIDE, CALCIUM CHLORIDE 600; 310; 30; 20 MG/100ML; MG/100ML; MG/100ML; MG/100ML
125 INJECTION, SOLUTION INTRAVENOUS CONTINUOUS
Status: DISCONTINUED | OUTPATIENT
Start: 2019-01-11 | End: 2019-01-11 | Stop reason: HOSPADM

## 2019-01-11 RX ORDER — SODIUM CHLORIDE 9 MG/ML
INJECTION, SOLUTION INTRAVENOUS AS NEEDED
Status: DISCONTINUED | OUTPATIENT
Start: 2019-01-11 | End: 2019-01-11 | Stop reason: HOSPADM

## 2019-01-11 RX ORDER — FENTANYL CITRATE/PF 50 MCG/ML
25 SYRINGE (ML) INJECTION
Status: DISCONTINUED | OUTPATIENT
Start: 2019-01-11 | End: 2019-01-11 | Stop reason: HOSPADM

## 2019-01-11 RX ORDER — DOCUSATE SODIUM 100 MG/1
100 CAPSULE, LIQUID FILLED ORAL 2 TIMES DAILY
Qty: 30 CAPSULE | Refills: 0 | Status: SHIPPED | OUTPATIENT
Start: 2019-01-11 | End: 2019-02-18

## 2019-01-11 RX ORDER — SODIUM CHLORIDE 9 MG/ML
INJECTION, SOLUTION INTRAVENOUS CONTINUOUS PRN
Status: DISCONTINUED | OUTPATIENT
Start: 2019-01-11 | End: 2019-01-11 | Stop reason: SURG

## 2019-01-11 RX ORDER — DIPHENHYDRAMINE HYDROCHLORIDE 50 MG/ML
12.5 INJECTION INTRAMUSCULAR; INTRAVENOUS ONCE AS NEEDED
Status: DISCONTINUED | OUTPATIENT
Start: 2019-01-11 | End: 2019-01-11 | Stop reason: HOSPADM

## 2019-01-11 RX ORDER — OXYCODONE HYDROCHLORIDE 5 MG/1
5 TABLET ORAL EVERY 4 HOURS PRN
Status: DISCONTINUED | OUTPATIENT
Start: 2019-01-11 | End: 2019-01-11 | Stop reason: HOSPADM

## 2019-01-11 RX ORDER — IBUPROFEN 600 MG/1
600 TABLET ORAL EVERY 6 HOURS PRN
Qty: 30 TABLET | Refills: 0
Start: 2019-01-11 | End: 2019-02-18

## 2019-01-11 RX ORDER — PROPOFOL 10 MG/ML
INJECTION, EMULSION INTRAVENOUS AS NEEDED
Status: DISCONTINUED | OUTPATIENT
Start: 2019-01-11 | End: 2019-01-11 | Stop reason: SURG

## 2019-01-11 RX ORDER — TAMSULOSIN HYDROCHLORIDE 0.4 MG/1
0.4 CAPSULE ORAL
Qty: 15 CAPSULE | Refills: 0 | Status: SHIPPED | OUTPATIENT
Start: 2019-01-11 | End: 2019-02-18 | Stop reason: ALTCHOICE

## 2019-01-11 RX ORDER — HYDROCODONE BITARTRATE AND ACETAMINOPHEN 5; 325 MG/1; MG/1
1 TABLET ORAL EVERY 6 HOURS PRN
Qty: 5 TABLET | Refills: 0 | Status: SHIPPED | OUTPATIENT
Start: 2019-01-11 | End: 2019-01-21

## 2019-01-11 RX ORDER — PHENAZOPYRIDINE HYDROCHLORIDE 200 MG/1
200 TABLET, FILM COATED ORAL 3 TIMES DAILY PRN
Qty: 10 TABLET | Refills: 0 | Status: SHIPPED | OUTPATIENT
Start: 2019-01-11 | End: 2019-01-14

## 2019-01-11 RX ORDER — LIDOCAINE HYDROCHLORIDE 10 MG/ML
INJECTION, SOLUTION INFILTRATION; PERINEURAL AS NEEDED
Status: DISCONTINUED | OUTPATIENT
Start: 2019-01-11 | End: 2019-01-11 | Stop reason: SURG

## 2019-01-11 RX ADMIN — LIDOCAINE HYDROCHLORIDE ANHYDROUS 50 MG: 10 INJECTION, SOLUTION INFILTRATION at 13:24

## 2019-01-11 RX ADMIN — PROPOFOL 100 MG: 10 INJECTION, EMULSION INTRAVENOUS at 13:25

## 2019-01-11 RX ADMIN — DEXAMETHASONE SODIUM PHOSPHATE 4 MG: 4 INJECTION, SOLUTION INTRAMUSCULAR; INTRAVENOUS at 13:26

## 2019-01-11 RX ADMIN — FENTANYL CITRATE 50 MCG: 50 INJECTION, SOLUTION INTRAMUSCULAR; INTRAVENOUS at 13:22

## 2019-01-11 RX ADMIN — CEFAZOLIN SODIUM 1000 MG: 1 INJECTION, POWDER, FOR SOLUTION INTRAMUSCULAR; INTRAVENOUS at 13:23

## 2019-01-11 RX ADMIN — ONDANSETRON 4 MG: 2 INJECTION INTRAMUSCULAR; INTRAVENOUS at 13:26

## 2019-01-11 RX ADMIN — PROPOFOL 200 MG: 10 INJECTION, EMULSION INTRAVENOUS at 13:24

## 2019-01-11 RX ADMIN — SODIUM CHLORIDE: 0.9 INJECTION, SOLUTION INTRAVENOUS at 13:20

## 2019-01-11 NOTE — H&P (VIEW-ONLY)
1  Urinary frequency  Urine culture   2  Ureteral stone  Case request operating room: CYSTOSCOPY URETEROSCOPY WITH LITHOTRIPSY HOLMIUM LASER, RETROGRADE PYELOGRAM AND INSERTION STENT URETERAL    Case request operating room: CYSTOSCOPY URETEROSCOPY WITH LITHOTRIPSY HOLMIUM LASER, RETROGRADE PYELOGRAM AND INSERTION STENT URETERAL         Assessment and plan:       1  Nephrolithiasis - managed by Dr Fernando Ware   - reviewed with the patient that he has retained ureteral calculus  We discussed these like asymptomatic at this time as there is minimal hydronephrosis  Reviewed the risks of retained ureteral calculus including urinary infection, pyelonephritis, hydronephrosis and renal failure  - recommendations are to proceed with cystoscopy, right ureteroscopy, holmium laser, basket extraction retrograde pyelogram, and right ureteral insertion for his retained ureteral calculus  Risks of the procedure reviewed in the office today and patient verbalized understanding  These will be reviewed preoperatively by the surgeon  This will be scheduled in near future  He is aware to remain NPO after midnight  Patient is aware to contact us in the interim with any urologic concern  All questions answered  Pat Stoner PA-C      Chief Complaint     Chief Complaint   Patient presents with    Nephrolithiasis         History of Present Illness     Cheyenne Barajas is a 29 y o  male patient of Dr Fernando Ware with a history of nephrolithiasis presenting for follow-up  Patient has a history of recurrent nephrolithiasis  He was referred to Nephrology for metabolic stone evaluation  Patient's 24 hour urine study urine output for the later  Patient also 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 and was initiated on potassium citrate mEq tablet daily by nephrologist       Patient had a recent ultrasound of the kidney and bladder (11/16/2018) which revealed a left lower pole 4 mm nonobstructing stone    There is no hydronephrosis  Left ureteral jet was not detected  A CT stone study obtained for further evaluation (12/07/2018) which did reveal a 4 mm calculus at the right UVJ  There is no significant right hydronephrosis noted at that time  This was felt to be the same stone from August 2018  There was a 3 mm nonobstructing stone in the left lower pole as well  Patient has been doing well over the past few weeks  He denies any dysuria, gross hematuria, suprapubic pressure, flank pain, nausea, vomiting, fevers, or chills  Denies any interval passage of stones  Laboratory     Lab Results   Component Value Date    CREATININE 0 90 12/13/2018       Review of Systems     Review of Systems   Constitutional: Negative for activity change, appetite change, chills, diaphoresis, fatigue, fever and unexpected weight change  Respiratory: Negative for chest tightness and shortness of breath  Cardiovascular: Negative for chest pain, palpitations and leg swelling  Gastrointestinal: Negative for abdominal distention, abdominal pain, constipation, diarrhea, nausea and vomiting  Genitourinary: Negative for decreased urine volume, difficulty urinating, dysuria, enuresis, flank pain, frequency, genital sores, hematuria and urgency  Musculoskeletal: Negative for back pain, gait problem and myalgias  Skin: Negative for color change, pallor, rash and wound  Psychiatric/Behavioral: Negative for behavioral problems  The patient is not nervous/anxious  Allergies     Allergies   Allergen Reactions    Sulfa Antibiotics Rash       Physical Exam     Physical Exam   Constitutional: He is oriented to person, place, and time  He appears well-developed and well-nourished  No distress  HENT:   Head: Normocephalic and atraumatic  Eyes: Conjunctivae are normal    Neck: Normal range of motion  No tracheal deviation present  Cardiovascular: Normal rate, regular rhythm and normal heart sounds    Exam reveals no gallop and no friction rub  No murmur heard  Pulmonary/Chest: Effort normal and breath sounds normal  No respiratory distress  He has no wheezes  He has no rales  Musculoskeletal: Normal range of motion  He exhibits no edema or deformity  Neurological: He is alert and oriented to person, place, and time  Skin: Skin is warm and dry  No rash noted  He is not diaphoretic  No erythema  Psychiatric: He has a normal mood and affect   His behavior is normal          Vital Signs     Vitals:    01/04/19 1134   BP: 118/82   BP Location: Left arm   Patient Position: Sitting   Cuff Size: Adult   Pulse: 98   Weight: 69 1 kg (152 lb 6 4 oz)   Height: 5' 7" (1 702 m)         Current Medications       Current Outpatient Prescriptions:     metoclopramide (REGLAN) 10 mg tablet, Take 1 tablet (10 mg total) by mouth every 6 (six) hours, Disp: 30 tablet, Rfl: 0    Potassium Citrate ER 15 MEQ (1620 MG) TBCR, Take 1 tablet by mouth daily, Disp: 30 tablet, Rfl: 5      Active Problems     Patient Active Problem List   Diagnosis    Lateral epicondylitis    Peptic disease    Renal colic on right side    Hematuria    Kidney stones     Past Medical History     Past Medical History:   Diagnosis Date    Abdominal pain, periumbilical 2/0/5677    Appendicitis 7/23/2016    Blepharitis of eyelid of left eye 6/30/2016    Eczema     Hypokalemia 9/2/2016    Leukocytosis 9/2/2016     Surgical History     Past Surgical History:   Procedure Laterality Date    CO LAP,APPENDECTOMY N/A 7/23/2016    Procedure: APPENDECTOMY LAPAROSCOPIC;  Surgeon: Lori Haywood DO;  Location: [facility];  Service: General       Family History     Family History   Problem Relation Age of Onset    Nephrolithiasis Maternal Grandfather        Social History     Social History     Radiology

## 2019-01-11 NOTE — DISCHARGE INSTRUCTIONS
You will be scheduled for ureteral stent removal in the office early next week  WHAT IS A STENT? At the end of the procedure, your doctor may place a stent into your ureter  A stent is a thin, flexible piece of plastic that will hold open your ureter while the remaining small pieces of stone pass  This allows your kidney to drain easily and prevents you from having to pass these small stone pieces on your own, which could be painful  The stent is about 12 inches long and looks and feels like a thin piece of spaghetti  AFTER THE PROCEDURE  After the procedure you may experience the following symptoms  All of these are normal and should resolve within 1 or 2 days after your stent is removed  1) Urinary frequency (urinating more often than usual)  2) Urinary urgency (the sensation that you need to urinate right away)  3) Painful urination (this can be pain in your bladder or in your back when  you urinate)  4) Blood in your urine ( a stent can irritate the lining of your bladder causing it to bleed)  5) Back/Flank pain, especially with urination  You will receive a prescription for narcotic pain medication after the procedure  You will also receive a prescription for tamsulosin which you will take once a day for 2 weeks to help relax your ureter and decrease stent discomfort  You will also need to purchase a stool softener (i e  Colace) or mild laxative (i e  Miralax) as the narcotic pain medication can make you constipated  This is important as constipation can exacerbate stent related symptoms  STENT REMOVAL  In some cases, your doctor will leave strings attached to your stent  The strings will be taped to your skin after the procedure  The strings will allow you to remove the thin flexible stent while you are at home  Normally, the stent can be removed 3-5 days after your procedure; your physician will tell you the specific date after your procedure       On the day you are supposed to remove your stent, do the followin) When you wake up in the morning, take 1-2 pain pills with food  Start your antibiotic pill the morning of schedule stent removal if prescribed  2) One hour later sit on the toilet or in the bath tub  3) Take a deep breath in and while exhaling, pull the string  4)  Dispose of the stent in the garbage  5)   Alternatively, you will come back for an office procedure to remove the stent by placing a small camera into your bladder to remove the stent  During the next 4-8 hours after removing your stent, you may experience additional blood in your urine, pain with urination or back/side pain  You should take the pain medication you were prescribed to help you with the pain, as well as continue the Flomax  If the pain is severe, you are vomiting, and/or have a fever > 101 4 please call the clinic

## 2019-01-11 NOTE — ANESTHESIA PREPROCEDURE EVALUATION
Review of Systems/Medical History          Cardiovascular   Pulmonary       GI/Hepatic       Kidney stones,        Endo/Other     GYN       Hematology   Musculoskeletal    Arthritis     Neurology   Psychology           Physical Exam    Airway    Mallampati score: II         Dental   No notable dental hx     Cardiovascular      Pulmonary      Other Findings        Anesthesia Plan  ASA Score- 2     Anesthesia Type- general with ASA Monitors  Additional Monitors:   Airway Plan: LMA  Comment: I, Dr Bertha Lujan, the attending physician, have personally seen and evaluated the patient prior to anesthetic care  I have reviewed the pre-anesthetic record, and other medical records if appropriate to the anesthetic care  If a CRNA is involved in the case, I have reviewed the CRNA assessment, if present, and agree  The patient is in a suitable condition to proceed with my formulated anesthetic plan        Plan Factors-    Induction- intravenous  Postoperative Plan-     Informed Consent- Anesthetic plan and risks discussed with patient  I personally reviewed this patient with the CRNA  Discussed and agreed on the Anesthesia Plan with the CRNA  Wilman Page

## 2019-01-11 NOTE — TELEPHONE ENCOUNTER
[facility] patient, managed by Dr Christine, s/p Right URS/ extraction of stone, right ureteral stent with string on Friday 1/11/19  Per Dr Christine, stent with string to be removed early next week, then follow up in 2 months with KUB/US ptv

## 2019-01-11 NOTE — OP NOTE
Operative Note     PATIENT:  Clifford Lawrence (MRN [id number])    DATE OF PROCEDURE:   1/11/2019    PRE-OP DIAGNOSIS:   1) Right ureteral calculus    POST-OP DIAGNOSIS:   1) Right ureteral calculus    PROCEDURES PERFORMED:  1) Cystoscopy  2) Right retrograde pyelography with fluoroscopic interpretation  3) Right ureteroscopy with basket extraction of stone  4) Right ureteral stent placement     SURGEON:  Reta Damon MD    NOTE:  There were no qualified teaching residents to assist with this case    ANESTHESIA: General     COMPLICATIONS:   None    ANTIBIOTICS:  Cefazolin    INTRAOPERATIVE THROMBOEMBOLISM PROPHYLAXIS:  Pneumatic compression stockings     FINDINGS:  Solitary right distal ureteral calculus with the endoscopic appearance of calcium oxalate monohydrate stone, basket extracted intact and a postoperative stent was left in place on a string    INDICATIONS FOR PROCEDURE:  Clifford Lawrence is an 29 y o  old male with Right ureteral calculus  After discussing the options, the patient elected to undergo ureteroscopy and ureteral stent placement  We discussed the procedure in detail, the alternatives, and the risks, and they signed informed consent to proceed  PROCEDURE IN DETAIL:   The patient was identified and brought to the OR  Antibiotic prophylaxis and DVT prophylaxis were administered  They were placed in the comfortable dorsal lithotomy position with care to pad all pressure points  They were prepped and draped in the usual sterile fashion using hibiclens  A surgical time out was performed with all in the room in agreement with the correct patient, procedure, indications, and laterality  A 21-Citizen of [place] rigid cystoscope was used to enter the bladder  The bladder was inspected in its entirety and there were no lesions noted  The ureteral orifices were identified in their normal orthotopic positions       The Right ureteral orifice was identified and a 5 Fr open ended catheter was placed into the ureteral orifice  A retrograde pyelogram was performed with the findings as described above  A Sensor wire was advanced up to the kidney under fluoroscopic guidance  Leaving this safety wire in place, the bladder was drained  A   7 5 [place] semi-rigid ureteroscope was advanced up the ureter under vision   The stone was encountered in the distal ureter  The stone was not noted to be impacted  A holmium laser fiber was passed through the ureteroscope and laser lithotripsy was commenced at settings of 0 7 J and 7 Hz  The stones were fragmented to very small pieces  Once we were satisfied that the stone was fragmented to dust, a 1 9 [place] zero tip nitinol basket was passed through the ureteroscope  The residual fragments were basketed out and removed  The ureteroscope was backed down the ureter under vision and there were no residual fragments and the ureter was noted to be intact with no injury and mild edema where the stone was located  A JJ stent was then passed up the wire  under fluoroscopic guidance into the kidney with a good curl noted in the kidney and in the bladder  An externalized tethering string was left in place and secured to the skin  The bladder was drained  The patient was placed back supine, awakened from general anesthesia and brought to recovery room in stable condition  ESTIMATED BLOOD LOSS:  Minimal      SPECIMENS:     Order Name Source Comment Collection Info Order Time   STONE ANALYSIS Ureter, Right  Collected By: Meño Joseph MD 1/11/2019  1:44 PM   TISSUE EXAM Ureter, Right  Collected By: Meño Joseph MD 1/11/2019  1:44 PM        IMPLANTS:     Implant Name Type Inv   Item Serial No   Lot No  LRB No  Used   URETERAL STENT 6 FR X 26 CM OPTIMA INLAY - [id number]   URETERAL STENT 6 FR X 26 CM OPTIMA INLAY   [employer] [id number] Right 1        COMPLICATIONS: None    DISPOSITION: PACU    PLAN:  Patient will be scheduled for ureteral stent removal in the office early next week and will then follow up in 2 months with a KUB and renal ultrasound

## 2019-01-14 ENCOUNTER — TELEPHONE (OUTPATIENT)
Dept: UROLOGY | Facility: AMBULATORY SURGERY CENTER | Age: 29
End: 2019-01-14

## 2019-01-14 NOTE — TELEPHONE ENCOUNTER
Called and spoke to patient, advised to continue Keflex and ok to schedule for stent with string removal tomorrow if he remains asymptomatic,  scheduled patient for stent with string removal tomorrow at 8 am Jeff  Aware to continue supportive measures

## 2019-01-14 NOTE — TELEPHONE ENCOUNTER
Called and spoke to patient, we had additionally received a message from answering service that patient was reporting fever  Called and spoke to patient, he states that he had contacted on call provider on Friday evening, after surgery to report that he had fever of 101 3, he was instructed to start his Keflex that had been prescribed for stent removal, he was prescribed additional Keflex and will complete the Keflex on Wednesday AM     Patient denies any further fever, denies chills, denies nausea and vomiting, he does have discomfort, and has not been taking any pain reliever  Instructed patient to medicate for pain with NSAIDS,  Recommend Ibuprofen 600 mg every 6 to 8 hours, patient prefers to take Aleve, advised this is ok per package instructions as this is also an NSAID  Instructed he may also use heating pad, continue Flomax, continue Pyridium as needed  Advised patient message will be sent to provider to confirm if ok to schedule for stent with string removal on Tuesday and that clinical will call him back with further recommendation

## 2019-01-14 NOTE — TELEPHONE ENCOUNTER
Patients father Eldridge Essex left message on answering service stated patient had high fever and also needs to set up appointment to see Dr Miguel Mccracken  Please advise

## 2019-01-14 NOTE — TELEPHONE ENCOUNTER
Agree with the continued plan of Keflex prior to stent removal   If patient remains asymptomatic, okay to continue with scheduled for Tuesday  Agree with symptomatic measures

## 2019-01-15 ENCOUNTER — PROCEDURE VISIT (OUTPATIENT)
Dept: UROLOGY | Facility: CLINIC | Age: 29
End: 2019-01-15
Payer: COMMERCIAL

## 2019-01-15 VITALS
BODY MASS INDEX: 24.04 KG/M2 | HEART RATE: 82 BPM | HEIGHT: 67 IN | WEIGHT: 153.2 LBS | SYSTOLIC BLOOD PRESSURE: 122 MMHG | DIASTOLIC BLOOD PRESSURE: 80 MMHG

## 2019-01-15 DIAGNOSIS — N20.0 KIDNEY STONE: Primary | ICD-10-CM

## 2019-01-15 PROCEDURE — 99211 OFF/OP EST MAY X REQ PHY/QHP: CPT

## 2019-01-15 NOTE — PATIENT INSTRUCTIONS
Increase fluids, medicate for pain with Ibuprofen 600 mg every 6 to 8 hours, heating pad if needed,  Continue Colace and increase Colace to twice daily, add Miralax one capful daily for bowel management  Complete Keflex antibiotic, call us if you develop any fever , chills, nausea or vomiting

## 2019-01-15 NOTE — PROGRESS NOTES
Cindy Frost  [id number]  84/32/1995      1/15/2019      Diagnosis  Chief Complaint     stent with string removal          Patient is s/p right reteroscopy with stone extraction on 1/11/19 with Dr Beryle [place]  Plan  Follow up in 6 to 8 weeks with US and KUB  Procedure Stent with String Removal    Vitals:    01/15/19 0803   BP: 122/80   Pulse: 82   Weight: 69 5 kg (153 lb 3 2 oz)   Height: 5' 7" (1 702 m)       Stent with string removed without difficulty  Reviewed post stent removal symptoms including flank pain, nausea, dysuria, and hematuria  Instructed to increase PO fluids  Take NSAIDS and other prescribed pain medication PRN  Encouraged to call with for uncontrolled pain and fever  Reviewed bowel management , Colace twice daily, add Miralax one capful daily, continue antibiotic as prescribed  Advance diet as tolerated        Shyanne Mcnair RN

## 2019-01-18 LAB
CA PHOS MFR STONE: 5 %
CALCIUM OXALATE DIHYDRATE MFR STONE IR: 25 %
COLOR STONE: NORMAL
COM MFR STONE: 70 %
COMMENT-STONE3: NORMAL
COMPOSITION: NORMAL
LABORATORY COMMENT REPORT: NORMAL
NIDUS STONE QL: NORMAL
PHOTO: NORMAL
SIZE STONE: NORMAL MM
STONE ANALYSIS-IMP: NORMAL
WT STONE: 25.3 MG

## 2019-02-18 ENCOUNTER — OFFICE VISIT (OUTPATIENT)
Dept: NEPHROLOGY | Facility: CLINIC | Age: 29
End: 2019-02-18
Payer: COMMERCIAL

## 2019-02-18 VITALS
HEIGHT: 67 IN | BODY MASS INDEX: 23.7 KG/M2 | WEIGHT: 151 LBS | HEART RATE: 84 BPM | SYSTOLIC BLOOD PRESSURE: 110 MMHG | DIASTOLIC BLOOD PRESSURE: 78 MMHG

## 2019-02-18 DIAGNOSIS — R82.991 HYPOCITRATURIA: ICD-10-CM

## 2019-02-18 DIAGNOSIS — N20.0 KIDNEY STONES: Primary | ICD-10-CM

## 2019-02-18 PROBLEM — R31.9 HEMATURIA: Status: RESOLVED | Noted: 2018-08-11 | Resolved: 2019-02-18

## 2019-02-18 PROBLEM — N23 RENAL COLIC ON RIGHT SIDE: Status: RESOLVED | Noted: 2018-07-31 | Resolved: 2019-02-18

## 2019-02-18 PROCEDURE — 99213 OFFICE O/P EST LOW 20 MIN: CPT | Performed by: INTERNAL MEDICINE

## 2019-02-18 NOTE — PROGRESS NOTES
NEPHROLOGY OUTPATIENT PROGRESS NOTE   Candy Gonzales 29 y o  male MRN: [id number]  DATE: 2/18/2019  Reason for visit:   Chief Complaint   Patient presents with    Follow-up    Nephrolithiasis     ASSESSMENT and PLAN:  Recurrent nephrolithiasis  -patient was found to have renal stone on CT scan in 2016 1st time when it showed bilateral intrarenal calculi 1 to 2 mm in size  -repeat CT scan in December 2018 showed 4 mm calculus at right UVJ and also punctate nonobstructing calculus in right mid to lower pole  Also 3 mm nonobstructing lower pole left kidney calculus  No hydronephrosis  -stone analysis from January 2019 shows 95% calcium oxalate, 5% calcium phosphate stone   -status post cystoscopy/ureteroscopy, ureteral stent removal in January 2019    -24 hour stone risk profile from December 2018 shows significantly low urine volume at 4:20 a m  5 mL, urine calcium 27 6, urine citrate significantly lower at 54, urine oxalate three, urine pH 6 6, urine sodium 77    -will do repeat 24 hour urine stone risk profile next month and make further recommendations based on the results   -no obvious evidence of hypokalemia or acidosis  PTH 34 6  -patient admits not to be drinking enough free water  I have strongly recommended him to increase fluid intake with goal urine output greater than 2 L per day  -salt restricted diet  -low oxalate diet, education material provided  -closely follows with Urology  -urinalysis in December 2018 bland without hematuria or proteinuria  Hypocitraturia, 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 IBS although denies any obvious history of ulcerative colitis/Crohn's disease  He has been having intermittent stomach upset issues and occasional loose stool may concern for diarrhea predominant IBS  He has followed with GI in the past   Never had endoscopy done  Diagnoses and all orders for this visit:    Kidney stones  -     Stone risk profile; Future  -     Basic metabolic panel; Future  -     Phosphorus; Future  -     Uric acid; Future  -     PTH, intact; Future  -     Magnesium; Future    Hypocitraturia  -     Stone risk profile; Future          SUBJECTIVE / HPI:  Isaias Gillespie is a 29y o  year old male without any significant medical history who presents for regular follow-up for nephrolithiasis  Patient was initially found to have bilateral intrarenal stones in 2016 based on CT scan although he was asymptomatic from renal stone perspective  Lately over last one year he started noticing intermittent flank/back pain and eventually had ureteroscopy with stent placement and eventual removal in January 2019  Since then he denies having any back or flank pain issues  24 hour urine stone risk profile was reviewed with the patient  He admits not to be drinking enough liquid    Denies any history of ulcerative colitis or Crohn's disease  Denies any autoimmune conditions  Does not take any medications on a regular basis  Denies any gross hematuria or any other urinary complaint  He does have stomach upset issues off and on      Family history includes grandfather having multiple kidney stones  REVIEW OF SYSTEMS:  More than 10 point review of systems were obtained and discussed in length with the patient  Complete review of systems were negative / unremarkable except mentioned above       PHYSICAL EXAM:  Vitals:    02/18/19 1602   BP: 110/78 BP Location: Left arm   Patient Position: Sitting   Cuff Size: Standard   Pulse: 84   Weight: 68 5 kg (151 lb)   Height: 5' 7" (1 702 m)     Body mass index is 23 65 kg/m²  Physical Exam   Constitutional: He is oriented to person, place, and time  He appears well-developed and well-nourished  HENT:   Head: Normocephalic and atraumatic  Right Ear: External ear normal    Left Ear: External ear normal    Nose: Nose normal    Eyes: Pupils are equal, round, and reactive to light  Conjunctivae and EOM are normal  No scleral icterus  Neck: Neck supple  No JVD present  Cardiovascular: Normal rate and normal heart sounds  Pulmonary/Chest: Effort normal and breath sounds normal  No respiratory distress  He has no wheezes  He has no rales  Abdominal: Soft  Bowel sounds are normal  He exhibits no distension  There is no tenderness  Musculoskeletal: He exhibits no edema or tenderness  Neurological: He is alert and oriented to person, place, and time  Skin: Skin is warm and dry  Psychiatric: He has a normal mood and affect  His behavior is normal    Vitals reviewed        PAST MEDICAL HISTORY:  Past Medical History:   Diagnosis Date    Abdominal pain, periumbilical 9/9/9693    Anesthesia complication     Patient felt shaky and balance problems post appendectomy-resolved post drinking apple juice-? low BS    Appendicitis 7/23/2016    Eczema     Hypokalemia 9/2/2016    Kidney stone     Leukocytosis 9/2/2016       PAST SURGICAL HISTORY:  Past Surgical History:   Procedure Laterality Date    FL RETROGRADE PYELOGRAM  1/11/2019    AR CYSTO/URETERO W/LITHOTRIPSY &INDWELL STENT INSRT Right 1/11/2019    Procedure: CYSTOSCOPY URETEROSCOPY, RETROGRADE PYELOGRAM AND INSERTION STENT URETERAL;  Surgeon: Eric Dyer MD;  [facility];  Service: Urology    AR LAP,APPENDECTOMY N/A 7/23/2016    Procedure: APPENDECTOMY LAPAROSCOPIC;  Surgeon: Nery Marcial DO;  [facility];  Service: General       SOCIAL HISTORY:  Social History     Substance and Sexual Activity   Alcohol Use No     Social History     Substance and Sexual Activity   Drug Use No     Social History     Tobacco Use   Smoking Status Never Smoker   Smokeless Tobacco Never Used       FAMILY HISTORY:  Family History   Problem Relation Age of Onset    Nephrolithiasis Maternal Grandfather        MEDICATIONS:    Current Outpatient Medications:     Potassium Citrate ER 15 MEQ (1620 MG) TBCR, Take 1 tablet by mouth daily (Patient taking differently: Take 1 tablet by mouth daily in the early morning  ), Disp: 30 tablet, Rfl: 5    Lab Results:   Results for orders placed or performed during the hospital encounter of 01/11/19   Stone analysis   Result Value Ref Range    COLOR Tan     Size 5x4x3 mm    Stone Weight 25 3 mg    Composition Comment     Ca Oxalate,Dihydrate 25 %    Ca Oxalate,Monohydr  70 %    CALCIUM PHOSPHATE 05 %    Nidus No Nidus visualized     STONE COMMENT Comment     Please note Comment     Disclaimer Comment     Photo Comment    Tissue Exam   Result Value Ref Range    Case Report       Surgical Pathology Report                         Case: [id number]                                   Authorizing Provider:  Manuela Croft MD    Collected:           01/11/2019 1344              Ordering Location:     [facility]        Received:            01/11/2019 [address] [place] [address]                                                      Pathologist:           Cortez Jiménez MD                                                                 Specimen:    Ureter, Right, Calc Right Ureter                                                           Final Diagnosis       A  Right ureter calculus:  - Calculus, gross diagnosis only     - Specimen sent to [facility] for chemical analysis Additional Information       All controls performed with the immunohistochemical stains reported above reacted appropriately  These tests were developed and their performance characteristics determined by [address] [place] [address] or [address] [place] [address]  They may not be cleared or approved by the [place]  Food and Drug Administration  The FDA has determined that such clearance or approval is not necessary  These tests are used for clinical purposes  They should not be regarded as investigational or for research  This laboratory has been approved by [place] [address], designated as a high-complexity laboratory and is qualified to perform these tests  Gross Description       A  The specimen is received without fixative, labeled with the patient's name and hospital number, and is designated "right ureter calculus  The specimen consists of 1 brown tan stony fragment measuring 0 4 cm in greatest dimension  Gross examination only  No sections submitted  The specimen is sent out for chemical analysis to:    [facility]  [facility] [place] [facility], [address] [place] [address]

## 2019-02-18 NOTE — PATIENT INSTRUCTIONS
Low Oxalate Diet   WHAT YOU NEED TO KNOW:   A low-oxalate diet is a meal plan that is low in oxalate  Oxalate is a chemical found in plant foods  You may need to eat foods that are low in oxalate to help clear kidney stones or prevent them from forming  People who have had kidney stones are at a higher risk of forming kidney stones again  The most common type of kidney stone is made up of crystals that contain calcium and oxalate  Your healthcare provider or dietitian may recommend that you limit oxalate if you get this type of kidney stone often  DISCHARGE INSTRUCTIONS:   Foods to include: Include the following foods that have a low to medium amount of oxalate    · Grains:      ¨ Egg noodles    ¨ Karthikeyan crackers    ¨ Pancakes and waffles    ¨ Cooked and dry cereals without nuts or bran    ¨ White or wild rice    ¨ White bread, cornbread, bagels, and white English muffins (medium oxalate)    ¨ Saltine or soda crackers and vanilla wafers (medium oxalate)    ¨ Brown rice, spaghetti, and other noodles and pastas (medium oxalate)    · Fruit:      ¨ Apples, bananas, grapes    ¨ Grapefruit and cranberries    ¨ Peaches, nectarines, apricots, and pears    ¨ Papayas and strawberries    ¨ Melons and pineapples    ¨ Blackberries, blueberries, mangoes, and prunes (medium oxalate)    · Vegetables:      ¨ Artichokes, asparagus, and brussels sprouts    ¨ Broccoli and cauliflower    ¨ Kale, endive, cabbage, and lettuce    ¨ Cucumbers, peas, and zucchini    ¨ Mushrooms, onions, and peppers    ¨ Potatoes and corn    ¨ Carrots, celery, and green beans (medium oxalate)    ¨ Parsnips, summer squash, tomatoes, and turnips (medium oxalate)    · Dairy:      ¨ American cheese, Swiss cheese, cottage cheese, ricotta cheese, and cheddar cheese    ¨ Milk and buttermilk    ¨ Yogurt    · Protein foods:      ¨ Meat, fish, shellfish, chicken, and turkey    [place] meat and ham (medium oxalate)    ¨ Hot dogs, bratwurst, rogers, and sausage (medium oxalate)    · Drinks and desserts:      ¨ Coffee    ¨ Fruit punch and lemonade or limeade without added vitamin C    · Desserts:      ¨ Cookies, cakes, and ice cream    ¨ Pudding without chocolate  Foods to limit or avoid:  Limit the following foods that are high in oxalate  · Grains:      ¨ Wheat bran, wheat germ, and barley    ¨ Grits and bran cereal    ¨ White corn flour and buckwheat flour    ¨ Whole wheat bread    · Fruit:      ¨ Dried apricots    ¨ Red currants, figs, and rhubarb    ¨ Kiwi    · Vegetables:      ¨ [place] greens, leeks, okra, and spinach    ¨ Wax beans     ¨ Eggplant    ¨ Beets and beet greens    ¨ Swiss chard, escarole, parsley, and rutabagas    ¨ Tomato paste    · Protein foods:      ¨ Baked beans with tomato sauce    ¨ Nut butters and nuts (peanuts, almonds, pecans, cashews, hazelnuts)    ¨ Soy burgers    ¨ Miso    ¨ Dried beans    · Desserts:      ¨ Fruitcake    ¨ Chocolate    ¨ Carob and marmalade    · Beverages:      ¨ Chocolate drink mixes    ¨ Soy milk    ¨ Instant iced tea    · Other foods:      ¨ Sesame seeds and tahini (paste made of sesame seeds)    ¨ Poppy seeds  Other dietary guidelines:   · Drink about 12 to 16 (eight-ounce) cups of liquid each day  Liquids help clear kidney stones and prevent them from forming again  Water is the best liquid to drink  You may need more liquid if you are physically active  Ask your healthcare provider or dietitian how much liquid you need to drink each day  · Your healthcare provider may suggest that you make other diet changes to help prevent kidney stones  This may include decreasing the amount of sodium you eat each day  © 2017 [address] Information is for End User's use only and may not be sold, redistributed or otherwise used for commercial purposes  All illustrations and images included in CareNotes® are the copyrighted property of A D A M , Inc  or Sesar Stover    The above information is an educational aid only  It is not intended as medical advice for individual conditions or treatments  Talk to your doctor, nurse or pharmacist before following any medical regimen to see if it is safe and effective for you

## 2019-03-04 ENCOUNTER — HOSPITAL ENCOUNTER (OUTPATIENT)
Dept: ULTRASOUND IMAGING | Facility: HOSPITAL | Age: 29
Discharge: HOME | End: 2019-03-04
Attending: UROLOGY
Payer: COMMERCIAL

## 2019-03-04 ENCOUNTER — HOSPITAL ENCOUNTER (OUTPATIENT)
Dept: RADIOLOGY | Facility: HOSPITAL | Age: 29
Discharge: HOME | End: 2019-03-04
Attending: UROLOGY
Payer: COMMERCIAL

## 2019-03-04 DIAGNOSIS — N20.0 KIDNEY STONE: ICD-10-CM

## 2019-03-04 PROCEDURE — 76770 US EXAM ABDO BACK WALL COMP: CPT

## 2019-03-04 PROCEDURE — 74018 RADEX ABDOMEN 1 VIEW: CPT

## 2019-03-15 ENCOUNTER — OFFICE VISIT (OUTPATIENT)
Dept: UROLOGY | Facility: CLINIC | Age: 29
End: 2019-03-15
Payer: COMMERCIAL

## 2019-03-15 VITALS
WEIGHT: 152.4 LBS | DIASTOLIC BLOOD PRESSURE: 68 MMHG | HEIGHT: 67 IN | SYSTOLIC BLOOD PRESSURE: 114 MMHG | HEART RATE: 108 BPM | BODY MASS INDEX: 23.92 KG/M2

## 2019-03-15 DIAGNOSIS — N20.0 KIDNEY STONE: Primary | ICD-10-CM

## 2019-03-15 PROCEDURE — 99213 OFFICE O/P EST LOW 20 MIN: CPT | Performed by: PHYSICIAN ASSISTANT

## 2019-03-15 NOTE — PROGRESS NOTES
1  Kidney stone  XR abdomen 1 view kub         Assessment and plan:       1  Nephrolithiasis - managed by Dr Quay Brunner   - status post right ureteroscopy and stone extraction 01/11/2019  - was happy to review with the patient his most recent KUB and ultrasound results  He has a very small left intrarenal calculus which I recommend continued observation for  Patient will follow up in 1 year with a repeat KUB prior to visit for continued surveillance  He is aware to contact us in the interim with any signs and symptoms of passing stone  - I provided patient with reassurance that his imaging looked excellent  His lower abdominal and lower back pain is not consistent with any urologic origin  I encouraged him to remain properly hydrated, minimize bladder irritants, managed bowel movements, and utilize over-the-counter Tylenol/heating pads for symptom alleviation  2   Hypocitraturia  - follows with Nephrology  - managed on potassium citrate  - reviewed the importance of proper hydration in eating a diet low in calcium and oxalate  Marti Jones PA-C      Chief Complaint      Follow-up kidney stones    History of Present Illness     Dara Soulier is a 29 y o  male patient of Dr Quay Brunner with a history of nephrolithiasis presenting for follow-up      Patient has a history of recurrent nephrolithiasis  He was referred to Nephrology for metabolic stone evaluation  Patient  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 and was initiated on potassium citrate mEq tablet daily by nephrologist     Patient underwent recent cystoscopy, right retrograde pyelogram, right ureteroscopy and basket extraction, and right ureteral stent placement (01/11/2019) without any complications  Stent was removed shortly thereafter in the office 01/15/2010  His stone analysis revealed 95% calcium oxalate and 5% calcium phosphate  Patient had a recent ultrasound of the kidney and bladder in KUB (03/04/2019)    Ultrasound revealed a 4 mm left lower pole intrarenal calculus  There is no evidence of obstructive uropathy  KUB confirmed punctate left lower pole density measuring approximately 2 mm  Patient states that he has been having some bilateral lower abdominal on bilateral lower back discomfort over the past few weeks  He denies any dysuria, gross hematuria, fevers, chills, nausea, or vomiting associated with this discomfort  Denies any precipitating or relieving factors  He is struggling with issues with IBS fluctuating between diarrhea and constipation  Laboratory     Lab Results   Component Value Date    CREATININE 0 90 12/13/2018       Review of Systems     Review of Systems   Constitutional: Negative for activity change, appetite change, chills, diaphoresis, fatigue, fever and unexpected weight change  Respiratory: Negative for chest tightness and shortness of breath  Cardiovascular: Negative for chest pain, palpitations and leg swelling  Gastrointestinal: Negative for abdominal distention, abdominal pain, constipation, diarrhea, nausea and vomiting  Genitourinary: Negative for decreased urine volume, difficulty urinating, dysuria, enuresis, flank pain, frequency, genital sores, hematuria and urgency  Musculoskeletal: Positive for back pain  Negative for gait problem and myalgias  Skin: Negative for color change, pallor, rash and wound  Psychiatric/Behavioral: Negative for behavioral problems  The patient is not nervous/anxious  Allergies     Allergies   Allergen Reactions    Sulfa Antibiotics Rash       Physical Exam     Physical Exam   Constitutional: He is oriented to person, place, and time  He appears well-developed and well-nourished  No distress  HENT:   Head: Normocephalic and atraumatic  Eyes: Conjunctivae are normal    Neck: Normal range of motion  No tracheal deviation present  Pulmonary/Chest: Effort normal    Musculoskeletal: Normal range of motion   He exhibits no edema or deformity  Neurological: He is alert and oriented to person, place, and time  Skin: Skin is warm and dry  No rash noted  He is not diaphoretic  No erythema  Psychiatric: He has a normal mood and affect   His behavior is normal          Vital Signs     Vitals:    03/15/19 1303   BP: 114/68   BP Location: Left arm   Patient Position: Sitting   Cuff Size: Adult   Pulse: (!) 108   Weight: 69 1 kg (152 lb 6 4 oz)   Height: 5' 7" (1 702 m)         Current Medications       Current Outpatient Medications:     Potassium Citrate ER 15 MEQ (1620 MG) TBCR, Take 1 tablet by mouth daily (Patient taking differently: Take 1 tablet by mouth daily in the early morning  ), Disp: 30 tablet, Rfl: 5      Active Problems     Patient Active Problem List   Diagnosis    Lateral epicondylitis    Peptic disease    Kidney stones    Ureteral stone    Hypocitraturia       Past Medical History     Past Medical History:   Diagnosis Date    Abdominal pain, periumbilical 6/7/6402    Anesthesia complication     Patient felt shaky and balance problems post appendectomy-resolved post drinking apple juice-? low BS    Appendicitis 7/23/2016    Eczema     Hypokalemia 9/2/2016    Kidney stone     Leukocytosis 9/2/2016       Surgical History     Past Surgical History:   Procedure Laterality Date    FL RETROGRADE PYELOGRAM  1/11/2019    AR CYSTO/URETERO W/LITHOTRIPSY &INDWELL STENT INSRT Right 1/11/2019    Procedure: CYSTOSCOPY URETEROSCOPY, RETROGRADE PYELOGRAM AND INSERTION STENT URETERAL;  Surgeon: Tiffanie Coleman MD;  [facility];  Service: Urology    AR LAP,APPENDECTOMY N/A 7/23/2016    Procedure: APPENDECTOMY LAPAROSCOPIC;  Surgeon: Sebastian Whitehead DO;  [facility];  Service: General       Family History     Family History   Problem Relation Age of Onset    Nephrolithiasis Maternal Grandfather      Social History     Social History     Radiology     ABDOMEN     INDICATION:   N20 0: Calculus of kidney      COMPARISON:  None     VIEWS:  [facility] supine        FINDINGS:     Punctate 2 mm density overlying the lower pole left kidney suspicious for calculus      No radiopaque ureteral calculi identified      Nonobstructive bowel gas pattern      No acute osseous abnormality is seen      IMPRESSION:  Punctate density over the lower pole left kidney suspicious for small 2 mm calculus  RENAL ULTRASOUND     INDICATION:   N20 0: Calculus of kidney      COMPARISON: 11/16/2018     TECHNIQUE:   Ultrasound of the retroperitoneum was performed with a curvilinear transducer utilizing volumetric sweeps and still imaging techniques       FINDINGS:     KIDNEYS:  Symmetric and normal size  Right kidney:  11 4 x 4 2 cm with cortical thickness 1 1 cm  Left kidney:  11 2 x 5 4 cm with cortical thickness 1 2 cm      Right kidney  Normal echogenicity and contour  No suspicious masses detected  No hydronephrosis  No shadowing calculi  No perinephric fluid collections      Left kidney  Normal echogenicity and contour  No suspicious masses detected  No hydronephrosis  4 mm round echogenic structure at the lower pole suspicious for a small stone  No perinephric fluid collections      URETERS:  Nonvisualized      BLADDER:   Normally distended  No focal thickening or mass lesions    Bilateral ureteral jets detected         IMPRESSION:  4 mm left lower pole kidney stone

## 2019-07-15 ENCOUNTER — TELEPHONE (OUTPATIENT)
Dept: NEPHROLOGY | Facility: CLINIC | Age: 29
End: 2019-07-15

## 2019-07-15 NOTE — TELEPHONE ENCOUNTER
I left a message for patient to schedule Aug follow up with Dr Connie Mesa in the [place] office

## 2020-03-19 ENCOUNTER — TELEPHONE (OUTPATIENT)
Dept: UROLOGY | Facility: CLINIC | Age: 30
End: 2020-03-19

## 2020-03-19 ENCOUNTER — TELEPHONE (OUTPATIENT)
Dept: GASTROENTEROLOGY | Facility: AMBULARY SURGERY CENTER | Age: 30
End: 2020-03-19

## 2020-03-19 NOTE — TELEPHONE ENCOUNTER
Patient is currently scheduled for tomorrow, 03/20/2020 for routine follow up  Due to current COVID-19 precautions, this will need to be canceled as it is non-urgent  It appears patient did not have his KUB as well  I would recommend him being rescheduled in 3-4 months with a KUB prior to visit  Orders already entered in epic  Thank you

## 2020-04-01 DIAGNOSIS — R82.991 HYPOCITRATURIA: ICD-10-CM

## 2020-04-01 DIAGNOSIS — N20.0 NEPHROLITHIASIS: ICD-10-CM

## 2020-04-01 RX ORDER — POTASSIUM CITRATE 15 MEQ/1
1 TABLET, EXTENDED RELEASE ORAL
Qty: 30 TABLET | Refills: 5 | Status: SHIPPED | OUTPATIENT
Start: 2020-04-01

## 2020-07-07 ENCOUNTER — TELEPHONE (OUTPATIENT)
Dept: NEPHROLOGY | Facility: CLINIC | Age: 30
End: 2020-07-07

## 2020-07-07 DIAGNOSIS — N20.0 KIDNEY STONES: Primary | ICD-10-CM

## 2020-07-07 DIAGNOSIS — R82.991 HYPOCITRATURIA: ICD-10-CM

## 2020-07-07 NOTE — TELEPHONE ENCOUNTER
----- Message from Vidal Lombardi MD sent at 7/7/2020 10:59 AM EDT -----  Yes he would need urinalysis microscopy, UPC ratio, BMP, phosphorus, PTH, 24 hour urine collection for stone risk profile  Thank you    ----- Message -----  From: Zachery Spangler  Sent: 7/6/2020   4:30 PM EDT  To: Vidal Lombardi MD    Patient has not has bloodwork since 12/2019, do you want more tests done before 7/10 appointment?

## 2020-07-07 NOTE — TELEPHONE ENCOUNTER
I left a message for the patient reminding him of his appointment on 7/10 with Dr Umberto Alcantar, and also there is blood and urine tests to be done for the appointment  Orders are in chart, I asked patient to call the office should he have any questions or need the orders sent to a different lab

## 2020-07-30 ENCOUNTER — TELEPHONE (OUTPATIENT)
Dept: NEPHROLOGY | Facility: CLINIC | Age: 30
End: 2020-07-30

## 2020-07-30 NOTE — TELEPHONE ENCOUNTER
LM for patient to see if he would like to reschedule follow up with Dr Luc[facility]

## 2021-05-03 ENCOUNTER — TELEMEDICINE (OUTPATIENT)
Dept: FAMILY MEDICINE CLINIC | Facility: CLINIC | Age: 31
End: 2021-05-03
Payer: COMMERCIAL

## 2021-05-03 VITALS
HEART RATE: 63 BPM | DIASTOLIC BLOOD PRESSURE: 82 MMHG | TEMPERATURE: 98.6 F | SYSTOLIC BLOOD PRESSURE: 133 MMHG | BODY MASS INDEX: 22.82 KG/M2 | HEIGHT: 66 IN | WEIGHT: 142 LBS

## 2021-05-03 DIAGNOSIS — B34.9 VIRAL INFECTION, UNSPECIFIED: ICD-10-CM

## 2021-05-03 DIAGNOSIS — Z03.818 ENCOUNTER FOR OBSERVATION FOR SUSPECTED EXPOSURE TO OTHER BIOLOGICAL AGENTS RULED OUT: ICD-10-CM

## 2021-05-03 PROCEDURE — U0005 INFEC AGEN DETEC AMPLI PROBE: HCPCS | Performed by: FAMILY MEDICINE

## 2021-05-03 PROCEDURE — 99213 OFFICE O/P EST LOW 20 MIN: CPT | Performed by: FAMILY MEDICINE

## 2021-05-03 PROCEDURE — U0003 INFECTIOUS AGENT DETECTION BY NUCLEIC ACID (DNA OR RNA); SEVERE ACUTE RESPIRATORY SYNDROME CORONAVIRUS 2 (SARS-COV-2) (CORONAVIRUS DISEASE [COVID-19]), AMPLIFIED PROBE TECHNIQUE, MAKING USE OF HIGH THROUGHPUT TECHNOLOGIES AS DESCRIBED BY CMS-2020-01-R: HCPCS | Performed by: FAMILY MEDICINE

## 2021-05-03 NOTE — PROGRESS NOTES
COVID-19 Outpatient Progress Note    Assessment/Plan:    Problem List Items Addressed This Visit        Other    Viral infection, unspecified     The patient is a 63-year-old male who has which developed less than 72 hours ago  He has no COVID-19 exposure that he is aware of  His brother works in [place] but had COVID-19 approximately 90 days ago and has subsequently had vaccination  We are going to have him go to the [place] [facility] site for SARs COVID 2 test   In the meantime will push fluids rest and use antipyretics  Will be in contact with him when his results are available  He is asked call sooner seek more urgent medical attention if he develops any new or worsening symptoms  He agrees with this plan  Relevant Orders    Novel Coronavirus (Covid-19),PCR [facility] - Collected at [place] [id number] or [facility]    Encounter for observation for suspected exposure to other biological agents ruled out    Relevant Orders    Novel Coronavirus (Covid-19),PCR [facility] - Collected at [place] [address] or [facility]         Disposition:     I referred patient to one of our centralized sites for a COVID-19 swab  I have spent 10 minutes directly with the patient  Greater than 50% of this time was spent in counseling/coordination of care regarding: instructions for management and impressions  Encounter provider Liberty Yeager MD    Provider located at [address]  [address] [place] [id number]    Recent Visits  No visits were found meeting these conditions  Showing recent visits within past 7 days and meeting all other requirements     Today's Visits  Date Type Provider Dept   05/03/21 Telemedicine Liberty Yeager MD [facility]   Showing today's visits and meeting all other requirements     Future Appointments  No visits were found meeting these conditions     Showing future appointments within next 150 days and meeting all other requirements      This virtual check-in was done via [employer] [facility] and patient was informed that this is a secure, HIPAA-compliant platform  He agrees to proceed  Patient agrees to participate in a virtual check in via telephone or video visit instead of presenting to the office to address urgent/immediate medical needs  Patient is aware this is a billable service  After connecting through [facility], the patient was identified by name and date of birth  Yovani Lawrence was informed that this was a telemedicine visit and that the exam was being conducted confidentially over secure lines  My office door was closed  No one else was in the room  Yovani Lawrence acknowledged consent and understanding of privacy and security of the telemedicine visit  I informed the patient that I have reviewed his record in Epic and presented the opportunity for him to ask any questions regarding the visit today  The patient agreed to participate  Subjective:   Yovani Lawrence is a 27 y o  male who is concerned about COVID-19  Patient's symptoms include fever, chills, fatigue, nausea, diarrhea and headache  Patient denies congestion, rhinorrhea, sore throat, anosmia, loss of taste, cough, shortness of breath, chest tightness and vomiting       Date of symptom onset: 4/30/2021    Exposure:   Contact with a person who is under investigation (PUI) for or who is positive for COVID-19 within the last 14 days?: No    Hospitalized recently for fever and/or lower respiratory symptoms?: No      Currently a healthcare worker that is involved in direct patient care?: No      Works in a special setting where the risk of COVID-19 transmission may be high? (this may include long-term care, correctional and MCC facilities; homeless shelters; assisted-living facilities and group homes ): No      Resident in a special setting where the risk of COVID-19 transmission may be high? (this may include long-term care, correctional and MCC facilities; homeless shelters; assisted-living facilities and group homes ): No      I have had episodes of diarrhea, nausea, fatigue  1 hour  HA and chills, fever to 100 5 Friday lisa  Has not been outside of home  No results found for: Melissa Mckeon, FAWN, Paula Francis [address]  Past Medical History:   Diagnosis Date    Abdominal pain, periumbilical 4/5/5638    Anesthesia complication     Patient felt shaky and balance problems post appendectomy-resolved post drinking apple juice-? low BS    Appendicitis 7/23/2016    Eczema     Hypokalemia 9/2/2016    Kidney stone     Leukocytosis 9/2/2016     Past Surgical History:   Procedure Laterality Date    FL RETROGRADE PYELOGRAM  1/11/2019    LA CYSTO/URETERO W/LITHOTRIPSY &INDWELL STENT INSRT Right 1/11/2019    Procedure: CYSTOSCOPY URETEROSCOPY, RETROGRADE PYELOGRAM AND INSERTION STENT URETERAL;  Surgeon: Hussein Stephens MD;  [facility];  Service: Urology    LA LAP,APPENDECTOMY N/A 7/23/2016    Procedure: APPENDECTOMY LAPAROSCOPIC;  Surgeon: Trae Rodríguez DO;  Location[facility];  Service: General     Current Outpatient Medications   Medication Sig Dispense Refill    Potassium Citrate ER 15 MEQ (1620 MG) TBCR Take 1 tablet by mouth daily in the early morning (Patient not taking: Reported on 5/3/2021) 30 tablet 5     No current facility-administered medications for this visit  Allergies   Allergen Reactions    Sulfa Antibiotics Rash       Review of Systems   Constitutional: Positive for chills, fatigue and fever  HENT: Negative for congestion, rhinorrhea and sore throat  Respiratory: Negative for cough, chest tightness and shortness of breath  Gastrointestinal: Positive for diarrhea and nausea  Negative for vomiting  Neurological: Positive for headaches       Objective:    Vitals:    05/03/21 1335   BP: 133/82   Pulse: 63   Temp: 98 6 °F (37 °C)   Weight: 64 4 kg (142 lb)   Height: 5' 6" (1 676 m)       Physical Exam  Constitutional: Appearance: Normal appearance  He is ill-appearing  Pulmonary:      Effort: Pulmonary effort is normal  No respiratory distress  Neurological:      Mental Status: He is alert and oriented to person, place, and time  Psychiatric:         Thought Content: Thought content normal          Judgment: Judgment normal        VIRTUAL VISIT DISCLAIMER    Candy Gonzales acknowledges that he has consented to an online visit or consultation  He understands that the online visit is based solely on information provided by him, and that, in the absence of a face-to-face physical evaluation by the physician, the diagnosis he receives is both limited and provisional in terms of accuracy and completeness  This is not intended to replace a full medical face-to-face evaluation by the physician  Candy Janet understands and accepts these terms

## 2021-05-03 NOTE — ASSESSMENT & PLAN NOTE
The patient is a 61-year-old male who has which developed less than 72 hours ago  He has no COVID-19 exposure that he is aware of  His brother works in [employer] but had COVID-19 approximately 90 days ago and has subsequently had vaccination  We are going to have him go to the [facility] site for SARs COVID 2 test   In the meantime will push fluids rest and use antipyretics  Will be in contact with him when his results are available  He is asked call sooner seek more urgent medical attention if he develops any new or worsening symptoms  He agrees with this plan

## 2021-05-04 LAB — SARS-COV-2 RNA RESP QL NAA+PROBE: NEGATIVE

## 2021-05-23 ENCOUNTER — IMMUNIZATIONS (OUTPATIENT)
Dept: FAMILY MEDICINE CLINIC | Facility: HOSPITAL | Age: 31
End: 2021-05-23

## 2021-05-23 DIAGNOSIS — Z23 ENCOUNTER FOR IMMUNIZATION: Primary | ICD-10-CM

## 2021-05-23 PROCEDURE — 91300 SARS-COV-2 / COVID-19 MRNA VACCINE (PFIZER-BIONTECH) 30 MCG: CPT

## 2021-05-23 PROCEDURE — 0001A SARS-COV-2 / COVID-19 MRNA VACCINE (PFIZER-BIONTECH) 30 MCG: CPT

## 2021-06-16 ENCOUNTER — IMMUNIZATIONS (OUTPATIENT)
Dept: FAMILY MEDICINE CLINIC | Facility: HOSPITAL | Age: 31
End: 2021-06-16

## 2021-06-16 DIAGNOSIS — Z23 ENCOUNTER FOR IMMUNIZATION: Primary | ICD-10-CM

## 2021-06-16 PROCEDURE — 91300 SARS-COV-2 / COVID-19 MRNA VACCINE (PFIZER-BIONTECH) 30 MCG: CPT

## 2021-06-16 PROCEDURE — 0002A SARS-COV-2 / COVID-19 MRNA VACCINE (PFIZER-BIONTECH) 30 MCG: CPT

## (undated) DEVICE — UROCATCH BAG

## (undated) DEVICE — GUIDEWIRE STRGHT TIP 0.035 IN  SOLO PLUS

## (undated) DEVICE — INVIEW CLEAR LEGGINGS: Brand: CONVERTORS

## (undated) DEVICE — 3M™ TEGADERM™ TRANSPARENT FILM DRESSING FRAME STYLE, 1624W, 2-3/8 IN X 2-3/4 IN (6 CM X 7 CM), 100/CT 4CT/CASE: Brand: 3M™ TEGADERM™

## (undated) DEVICE — PACK TUR

## (undated) DEVICE — PREMIUM DRY TRAY LF: Brand: MEDLINE INDUSTRIES, INC.

## (undated) DEVICE — BASKET STONE RTRVL ZERO TIP 2.4FR

## (undated) DEVICE — GLOVE SRG BIOGEL 7

## (undated) DEVICE — STERILE SURGICAL LUBRICANT,  TUBE: Brand: SURGILUBE

## (undated) DEVICE — CHLORHEXIDINE 4PCT 4 OZ

## (undated) DEVICE — CATH URETERAL 5FR X 70 CM FLEX TIP POLYUR BARD